# Patient Record
Sex: FEMALE | Race: WHITE | NOT HISPANIC OR LATINO | Employment: UNEMPLOYED | URBAN - METROPOLITAN AREA
[De-identification: names, ages, dates, MRNs, and addresses within clinical notes are randomized per-mention and may not be internally consistent; named-entity substitution may affect disease eponyms.]

---

## 2018-07-25 ENCOUNTER — OFFICE VISIT (OUTPATIENT)
Dept: FAMILY MEDICINE CLINIC | Facility: CLINIC | Age: 48
End: 2018-07-25
Payer: COMMERCIAL

## 2018-07-25 VITALS
SYSTOLIC BLOOD PRESSURE: 178 MMHG | HEART RATE: 97 BPM | TEMPERATURE: 98.3 F | BODY MASS INDEX: 29.06 KG/M2 | DIASTOLIC BLOOD PRESSURE: 116 MMHG | WEIGHT: 174.4 LBS | HEIGHT: 65 IN | OXYGEN SATURATION: 99 %

## 2018-07-25 DIAGNOSIS — R06.02 SOB (SHORTNESS OF BREATH) ON EXERTION: ICD-10-CM

## 2018-07-25 DIAGNOSIS — I10 UNCONTROLLED HYPERTENSION: ICD-10-CM

## 2018-07-25 DIAGNOSIS — Z13.220 LIPID SCREENING: ICD-10-CM

## 2018-07-25 DIAGNOSIS — J45.40 MODERATE PERSISTENT ASTHMA WITHOUT COMPLICATION: ICD-10-CM

## 2018-07-25 DIAGNOSIS — R05.8 COUGH PRESENT FOR GREATER THAN 3 WEEKS: ICD-10-CM

## 2018-07-25 DIAGNOSIS — Z13.29 THYROID DISORDER SCREEN: ICD-10-CM

## 2018-07-25 DIAGNOSIS — F17.200 TOBACCO USE DISORDER: Primary | ICD-10-CM

## 2018-07-25 PROCEDURE — 3725F SCREEN DEPRESSION PERFORMED: CPT | Performed by: PHYSICIAN ASSISTANT

## 2018-07-25 PROCEDURE — 99205 OFFICE O/P NEW HI 60 MIN: CPT | Performed by: PHYSICIAN ASSISTANT

## 2018-07-25 RX ORDER — ALBUTEROL SULFATE 90 UG/1
2 AEROSOL, METERED RESPIRATORY (INHALATION) EVERY 6 HOURS PRN
Qty: 18 G | Refills: 1 | Status: SHIPPED | OUTPATIENT
Start: 2018-07-25

## 2018-07-25 RX ORDER — FLUTICASONE PROPIONATE 110 UG/1
2 AEROSOL, METERED RESPIRATORY (INHALATION) 2 TIMES DAILY
Qty: 2 INHALER | Refills: 1 | Status: SHIPPED | OUTPATIENT
Start: 2018-07-25 | End: 2018-08-15

## 2018-07-25 RX ORDER — OLMESARTAN MEDOXOMIL 5 MG/1
5 TABLET ORAL DAILY
Qty: 90 TABLET | Refills: 0 | Status: SHIPPED | OUTPATIENT
Start: 2018-07-25 | End: 2018-07-31 | Stop reason: CLARIF

## 2018-07-25 RX ORDER — DULOXETIN HYDROCHLORIDE 60 MG/1
60 CAPSULE, DELAYED RELEASE ORAL 2 TIMES DAILY
COMMUNITY
Start: 2018-07-17

## 2018-07-25 RX ORDER — ALBUTEROL SULFATE 2.5 MG/3ML
2.5 SOLUTION RESPIRATORY (INHALATION) EVERY 6 HOURS PRN
COMMUNITY
End: 2018-07-25 | Stop reason: SDUPTHER

## 2018-07-25 RX ORDER — ALBUTEROL SULFATE 2.5 MG/3ML
2.5 SOLUTION RESPIRATORY (INHALATION) EVERY 6 HOURS PRN
Qty: 60 ML | Refills: 0 | Status: SHIPPED | OUTPATIENT
Start: 2018-07-25

## 2018-07-25 RX ORDER — OLMESARTAN MEDOXOMIL 5 MG/1
5 TABLET ORAL DAILY
COMMUNITY
End: 2018-07-25 | Stop reason: SDUPTHER

## 2018-07-25 RX ORDER — RIZATRIPTAN BENZOATE 10 MG/1
10 TABLET, ORALLY DISINTEGRATING ORAL
COMMUNITY
Start: 2014-02-27 | End: 2018-11-20 | Stop reason: SDUPTHER

## 2018-07-25 NOTE — PATIENT INSTRUCTIONS
Hypertension   AMBULATORY CARE:   Hypertension  is high blood pressure (BP)  Your BP is the force of your blood moving against the walls of your arteries  Normal BP is less than 120/80  Prehypertension is between 120/80 and 139/89  Hypertension is 140/90 or higher  Hypertension causes your BP to get so high that your heart has to work much harder than normal  This can damage your heart  You can control hypertension with a healthy lifestyle or medicines  A controlled blood pressure helps protect your organs, such as your heart, lungs, brain, and kidneys  Common symptoms include the following:   · Headache     · Blurred vision     · Chest pain     · Dizziness or weakness     · Trouble breathing    · Nosebleeds  Call 911 for any of the following:   · You have discomfort in your chest that feels like squeezing, pressure, fullness, or pain  · You become confused or have difficulty speaking  · You suddenly feel lightheaded or have trouble breathing  · You have pain or discomfort in your back, neck, jaw, stomach, or arm  Seek care immediately if:   · You have a severe headache or vision loss  · You have weakness in an arm or leg  Contact your healthcare provider if:   · You feel faint, dizzy, confused, or drowsy  · You have been taking your BP medicine and your BP is still higher than your healthcare provider says it should be  · You have questions or concerns about your condition or care  Treatment for hypertension  may include medicine to lower your BP and lower your cholesterol level  A low cholesterol level helps prevent heart disease and makes it easier to control your blood pressure  You may also need to make lifestyle changes  Take your medicine exactly as directed  Manage hypertension:  Talk with your healthcare provider about these and other ways to manage hypertension:  · Check your BP at home  Sit and rest for 5 minutes before you take your BP   Extend your arm and support it on a flat surface  Your arm should be at the same level as your heart  Follow the directions that came with your BP monitor  If possible, take at least 2 BP readings each time  Take your BP at least twice a day at the same times each day, such as morning and evening  Keep a record of your BP readings and bring it to your follow-up visits  Ask your healthcare provider what your BP should be  · Limit sodium (salt) as directed  Too much sodium can affect your fluid balance  Check labels to find low-sodium or no-salt-added foods  Some low-sodium foods use potassium salts for flavor  Too much potassium can also cause health problems  Your healthcare provider will tell you how much sodium and potassium are safe for you to have in a day  He or she may recommend that you limit sodium to 2,300 mg a day  · Follow the meal plan recommended by your healthcare provider  A dietitian or your provider can give you more information on low-sodium plans or the DASH (Dietary Approaches to Stop Hypertension) eating plan  The DASH plan is low in sodium, unhealthy fats, and total fat  It is high in potassium, calcium, and fiber  · Exercise to maintain a healthy weight  Exercise at least 30 minutes per day, on most days of the week  This will help decrease your blood pressure  Ask your healthcare provider about the best exercise plan for you  · Decrease stress  This may help lower your BP  Learn ways to relax, such as deep breathing or listening to music  · Limit alcohol  Women should limit alcohol to 1 drink a day  Men should limit alcohol to 2 drinks a day  A drink of alcohol is 12 ounces of beer, 5 ounces of wine, or 1½ ounces of liquor  · Do not smoke  Nicotine and other chemicals in cigarettes and cigars can increase your BP and also cause lung damage  Ask your healthcare provider for information if you currently smoke and need help to quit  E-cigarettes or smokeless tobacco still contain nicotine  Talk to your healthcare provider before you use these products  · Manage any other health conditions you have  Health conditions such as diabetes can increase your risk for hypertension  Follow your healthcare provider's instructions and take all your medicines as directed  Follow up with your healthcare provider as directed: You will need to return to have your BP checked and to have other lab tests done  Write down your questions so you remember to ask them during your visits  © 2017 [address] [employer] Information is for End User's use only and may not be sold, redistributed or otherwise used for commercial purposes  All illustrations and images included in CareNotes® are the copyrighted property of [employer]  or Venkata Walker  The above information is an  only  It is not intended as medical advice for individual conditions or treatments  Talk to your doctor, nurse or pharmacist before following any medical regimen to see if it is safe and effective for you  Asthma   AMBULATORY CARE:   Asthma  is a lung disease that makes breathing difficult  Chronic inflammation and reactions to triggers narrow the airways in your lungs  Asthma can become life-threatening if it is not managed  Cough-variant asthma  is a type of asthma that causes a dry cough that keeps coming back  A dry cough may be your only symptom, or you may also have chest tightness  These symptoms may be caused by exercise or exposure to odors, allergens, or respiratory tract infections  Cough-variant asthma is treated the same way as typical asthma  Common symptoms include the following:   · Coughing     · Wheezing     · Shortness of breath     · Chest tightness  Seek care immediately if:   · You have severe shortness of breath  · Your lips or nails turn blue or gray  · The skin around your neck and ribs pulls in with each breath      · You have shortness of breath, even after you take your short-term medicine as directed  · Your peak flow numbers are in the red zone of your AAP  Contact your healthcare provider if:   · You run out of medicine before your next refill is due  · Your symptoms get worse  · You need to take more medicine than usual to control your symptoms  · You have questions or concerns about your condition or care  Treatment for asthma  will depend on how severe your asthma is  Medicine may decrease inflammation, open airways, and make it easier to breathe  Medicines may be inhaled, taken as a pill, or injected  Short-term medicines relieve your symptoms quickly  Long-term medicines are used to prevent future attacks  You may also need medicine to help control your allergies  Manage and prevent future asthma attacks:   · Follow your asthma action plan  This is a written plan that you and your healthcare provider create  It explains which medicine you need and when to change doses if necessary  It also explains how you can monitor symptoms and use a peak flow meter  The meter measures how well your lungs are working  · Manage other health conditions , such as allergies, acid reflux, and sleep apnea  · Identify and avoid triggers  These may include pets, dust mites, mold, and cockroaches  · Do not smoke or be around others who smoke  Nicotine and other chemicals in cigarettes and cigars can cause lung damage  Ask your healthcare provider for information if you currently smoke and need help to quit  E-cigarettes or smokeless tobacco still contain nicotine  Talk to your healthcare provider before you use these products  · Ask about the flu vaccine  The flu can make your asthma worse  You may need a yearly flu shot  Follow up with your healthcare provider as directed: You will need to return to make sure your medicine is working and your symptoms are controlled   You may be referred to an asthma or allergy specialist  Darrel Alvares may be asked to keep a record of your peak flow values and bring it with you to your appointments  Write down your questions so you remember to ask them during your visits  © 2017 [facility] Information is for End User's use only and may not be sold, redistributed or otherwise used for commercial purposes  All illustrations and images included in CareNotes® are the copyrighted property of [employer]  or Venkata Walker  The above information is an  only  It is not intended as medical advice for individual conditions or treatments  Talk to your doctor, nurse or pharmacist before following any medical regimen to see if it is safe and effective for you  Cigarette Smoking and Your Health   AMBULATORY CARE:   Risks to your health if you smoke:  Nicotine and other chemicals found in tobacco damage every cell in your body  Even if you are a light smoker, you have an increased risk for cancer, heart disease, and lung disease  If you are pregnant or have diabetes, smoking increases your risk for complications  Benefits to your health if you stop smoking:   · You decrease respiratory symptoms such as coughing, wheezing, and shortness of breath  · You reduce your risk for cancers of the lung, mouth, throat, kidney, bladder, pancreas, stomach, and cervix  If you already have cancer, you increase the benefits of chemotherapy  You also reduce your risk for cancer returning or a second cancer from developing  · You reduce your risk for heart disease, blood clots, heart attack, and stroke  · You reduce your risk for lung infections, and diseases such as pneumonia, asthma, chronic bronchitis, and emphysema  · Your circulation improves  More oxygen can be delivered to your body  If you have diabetes, you lower your risk for complications, such as kidney, artery, and eye diseases  You also lower your risk for nerve damage   Nerve damage can lead to amputations, poor vision, and blindness  · You improve your body's ability to heal and to fight infections  Benefits to the health of others if you stop smoking:  Tobacco is harmful to nonsmokers who breathe in your secondhand smoke  The following are ways the health of others around you may improve when you stop smoking:  · You lower the risks for lung cancer and heart disease in nonsmoking adults  · If you are pregnant, you lower the risk for miscarriage, early delivery, low birth weight, and stillbirth  You also lower your baby's risk for SIDS, obesity, developmental delay, and neurobehavioral problems, such as ADHD  · If you have children, you lower their risk for ear infections, colds, pneumonia, bronchitis, and asthma  For more information and support to stop smoking:   · [employer]  Phone: 3- 844 - 770-3738  Web Address: www [employer]  Follow up with your healthcare provider as directed:  Write down your questions so you remember to ask them during your visits  © 2017 [address] [employer] Information is for End User's use only and may not be sold, redistributed or otherwise used for commercial purposes  All illustrations and images included in CareNotes® are the copyrighted property of [employer]  or Venkata Walker  The above information is an  only  It is not intended as medical advice for individual conditions or treatments  Talk to your doctor, nurse or pharmacist before following any medical regimen to see if it is safe and effective for you  How to Stop Smoking   AMBULATORY CARE:   You will improve your health and the health of others around you  if you stop smoking  Your risk for heart and lung disease, cancer, stroke, heart attack, and vision problems will also decrease  You can benefit from quitting no matter how long you have smoked  Prepare to stop smoking:  Nicotine is a highly addictive drug found in cigarettes   Withdrawal symptoms can happen when you stop smoking and make it hard to quit  These include anxiety, depression, irritability, trouble sleeping, and increased appetite  You increase your chances of success if you prepare to quit  · Set a quit date  Deryl Sin a date that is within the next 2 weeks  Do not pick a day that you think may be stressful or busy  Write down the day or [place] it on your calender  · Tell friends and family that you plan to quit  Explain that you may have withdrawal symptoms when you try to quit  Ask them to support you  They may be able to encourage you and help reduce your stress to make it easier for you to quit  · Make a list of your reasons for quitting  Put the list somewhere you will see it every day, such as your refrigerator  You can look at the list when you have a craving  · Remove all tobacco and nicotine products from your home, car, and workplace  Also, remove anything else that will tempt you to smoke, such as lighters, matches, or ashtrays  Clean your car, home, and places at work that smell like smoke  The smell of smoke can trigger a craving  · Identify triggers that make you want to smoke  This may include activities, feelings, or people  Also write down 1 way you can deal with each of your triggers  For example, if you want to smoke as soon as you wake up, plan another activity during this time, such as exercise  · Make a plan for how you will quit  Learn about the tools that can help you quit, such as medicine, counseling, or nicotine replacement therapy  Choose at least 2 options to help you quit  Tools to help you stop smoking:   · Counseling  from a trained healthcare provider can provide you with support and skills to quit smoking  The provider will also teach you to manage your withdrawal symptoms and cravings  You may receive counseling from one counselor, in group therapy, or through phone therapy called a quit line       · Nicotine replacement therapy (NRT)  such as nicotine patches, gum, or lozenges may help reduce your nicotine cravings  You may get these without a doctor's order  Do not use e-cigarettes or smokeless tobacco in place of cigarettes or to help you quit  They still contain nicotine  · Prescription medicines  such as nasal sprays or nicotine inhalers may help reduce your withdrawal symptoms  Other medicines may also be used to reduce your urge to smoke  Ask your healthcare provider about these medicines  You may need to start certain medicines 2 weeks before your quit date for them to work well  · Hypnosis  is a practice that helps guide you through thoughts and feelings  Hypnosis may help decrease your cravings and make you more willing to quit  · Acupuncture therapy  uses very thin needles to balance energy channels in the body  This is thought to help decrease cravings and symptoms of nicotine withdrawal      · Support groups  let you talk to others who are trying to quit or have already quit  It may be helpful to speak with others about how they quit  Manage your cravings:   · Avoid situations, people, and places that tempt you to smoke  Go to nonsmoking places, such as libraries or restaurants  Understand what tempts you and try to avoid these things  · Keep your hands busy  Hold things such as a stress ball or pen  · Put candy or toothpicks in your mouth  Keep lollipops, sugarless gum, or toothpicks with you at all times  · Do not have alcohol or caffeine  These drinks may tempt you to smoke  Drink healthy liquids such as water or juice instead  · Reward yourself when you resist your cravings  Rewards will motivate you and help you stay positive  · Do an activity that distracts you from your craving  Examples include going for a walk, exercising, or cleaning  Prevent weight gain after you quit:  You may gain a few pounds after you quit smoking  It is healthier for you to gain a few pounds than to continue to smoke   The following can help you prevent weight gain:  · Eat healthy foods  These include fruits, vegetables, whole-grain breads, low-fat dairy products, beans, lean meats, and fish  Eat healthy snacks, such as low-fat yogurt, if you get hungry between meals  · Drink water before, during, and between meals  This will make your stomach feel full and help prevent you from overeating  Ask your healthcare provider how much liquid to drink each day and which liquids are best for you  · Exercise  Take a walk or do some kind of exercise every day  Ask your healthcare provider what exercise is right for you  This may help reduce your cravings and reduce stress  For more support and information:   · [employer]  Phone: 9- 755 - 272-6370  Web Address: www [facility]  © 2017 [address] [employer] Information is for End User's use only and may not be sold, redistributed or otherwise used for commercial purposes  All illustrations and images included in CareNotes® are the copyrighted property of [employer]  or Venkata Walker  The above information is an  only  It is not intended as medical advice for individual conditions or treatments  Talk to your doctor, nurse or pharmacist before following any medical regimen to see if it is safe and effective for you

## 2018-07-25 NOTE — PROGRESS NOTES
New Patient     Flor Anderson 56 y o  female   Date:  7/25/2018      Assessment and Plan:    Kristyn Castañeda was seen today for establish care and shortness of breath  Diagnoses and all orders for this visit:    Tobacco use disorder  -     fluticasone (FLOVENT HFA) 110 MCG/ACT inhaler; Inhale 2 puffs 2 (two) times a day Rinse mouth after use  -     albuterol (PROVENTIL HFA,VENTOLIN HFA) 90 mcg/act inhaler; Inhale 2 puffs every 6 (six) hours as needed for wheezing  -     XR chest pa & lateral; Future  -     Spirometry pre and post bronchodilator; Future  - encourage cessation    Moderate persistent asthma without complication  -     fluticasone (FLOVENT HFA) 110 MCG/ACT inhaler; Inhale 2 puffs 2 (two) times a day Rinse mouth after use  -     albuterol (PROVENTIL HFA,VENTOLIN HFA) 90 mcg/act inhaler; Inhale 2 puffs every 6 (six) hours as needed for wheezing  -     albuterol (2 5 mg/3 mL) 0 083 % nebulizer solution; Take 1 vial (2 5 mg total) by nebulization every 6 (six) hours as needed for wheezing or shortness of breath  -     Nebulizer  -     XR chest pa & lateral; Future  -     Spirometry pre and post bronchodilator; Future  -     CBC and differential; Future    Uncontrolled hypertension  -     olmesartan (BENICAR) 5 mg tablet; Take 1 tablet (5 mg total) by mouth daily  -     ECG 12 lead; Future  -     XR chest pa & lateral; Future  -     CBC and differential; Future  -     Comprehensive metabolic panel; Future  -     TSH, 3rd generation with T4 reflex; Future  -     Troponin I; Future  - smoking cessation, well balanced low salt diet   - follow up in 2 weeks and will likely need dose adjustment in benicar     Cough present for greater than 3 weeks  -     XR chest pa & lateral; Future  -     Spirometry pre and post bronchodilator; Future    Lipid screening  -     Lipid panel; Future    Thyroid disorder screen  -     TSH, 3rd generation with T4 reflex;  Future    SOB (shortness of breath) on exertion  -     ECG 12 lead; Future  -     XR chest pa & lateral; Future  -     CBC and differential; Future  -     Comprehensive metabolic panel; Future  -     Troponin I; Future            HPI:  Chief Complaint   Patient presents with    Establish Care     initial visit    Shortness of Breath     Pt states that within the last 2 month she feels like she is unable to catch her breath  HPI  Patient is a 57 yo female who presents to establish care  Her last PCP was Dr Analia Miller, last seen >1 year ago  She follows with Dr Mccauley/Neurology for migraines x 7-8 years and Dr Geeta Garcia x 3-4 years  She did have hx of vaginal cancer s/p hypsterectomy and radiation with [facility] and will be going back for routine GYN care  She sas not been to eye doctor or dentist due to lack of insurance  She did have hx of HTN and was treated with norvasc which was d/c'ed due to edema and then was treated with benicar 5 mg which she has been out of for months  She does have hx of asthma and smokes 1 pppd >30 years  Over the past 2-3 months, she had been experiencing a lot of sob and wheezing, dry cough  Sob was mostly when active or exerting herself, even when standing to do dishes or showering and then would improve with rest  She did have an inhlaer with temporary relief but has been out for months  She denies associated chest pain, diaphoresis, edema, syncope but sometimes would get so out of breath she would get a little dizzy/lightheaded  She was waking up in the middle of the night gasping for air and humid weather has been making chest very tight  She has never had dx of COPD, only asthma  No recent illnesses  She denies hx of allergies  She was using inhaler more than once in a day  Review of Systems   Constitutional: Negative for activity change, appetite change, chills, diaphoresis and fever  HENT: Negative for congestion, ear pain, postnasal drip, sore throat and trouble swallowing  Eyes: Negative      Respiratory: Positive for cough, chest tightness, shortness of breath and wheezing  Negative for apnea, choking and stridor  Cardiovascular: Negative for chest pain, palpitations and leg swelling  Gastrointestinal: Negative  Endocrine: Negative  Genitourinary: Negative  Musculoskeletal: Negative  Skin: Negative  Allergic/Immunologic: Negative  Neurological: Negative  Headaches: hx of migraines  Hematological: Negative  Psychiatric/Behavioral: Positive for dysphoric mood  Negative for behavioral problems, sleep disturbance and suicidal ideas  The patient is nervous/anxious          Past Medical History:   Diagnosis Date    Asthma     Cancer ([facility] )     Chicken pox     Depression     Headache     Migraines     Vaginal cancer (HCC)     s/p radiation, treated 2014, [facility]     Patient Active Problem List   Diagnosis    Bipolar 2 disorder, major depressive episode ([facility] )    Chronic migraine without aura    Essential hypertension    Generalized anxiety disorder    Lichen    Tobacco use disorder       Past Surgical History:   Procedure Laterality Date    CARPAL TUNNEL RELEASE Bilateral     HYSTERECTOMY         Social History     Social History    Marital status: Legally      Spouse name: N/A    Number of children: N/A    Years of education: N/A     Social History Main Topics    Smoking status: Current Every Day Smoker     Packs/day: 1 00     Years: 30 00    Smokeless tobacco: Never Used    Alcohol use No    Drug use: No    Sexual activity: Not Asked     Other Topics Concern    None     Social History Narrative    Lives with sister     Unemployed        Family History   Problem Relation Age of Onset    Hypertension Mother     Heart disease Father     Diabetes Father     Hyperlipidemia Father     Heart attack Father     Stroke Maternal Grandfather     Cancer Maternal Uncle     Brain cancer Maternal Uncle     Stroke Paternal Grandmother        No Known Allergies      Current Outpatient Prescriptions:     albuterol (2 5 mg/3 mL) 0 083 % nebulizer solution, Take 1 vial (2 5 mg total) by nebulization every 6 (six) hours as needed for wheezing or shortness of breath, Disp: 60 mL, Rfl: 0    DULoxetine (CYMBALTA) 60 mg delayed release capsule, 1 tab po bid, Disp: , Rfl:     olmesartan (BENICAR) 5 mg tablet, Take 1 tablet (5 mg total) by mouth daily, Disp: 90 tablet, Rfl: 0    rizatriptan (MAXALT-MLT) 10 MG disintegrating tablet, Take 10 mg by mouth, Disp: , Rfl:     albuterol (PROVENTIL HFA,VENTOLIN HFA) 90 mcg/act inhaler, Inhale 2 puffs every 6 (six) hours as needed for wheezing, Disp: 18 g, Rfl: 1    fluticasone (FLOVENT HFA) 110 MCG/ACT inhaler, Inhale 2 puffs 2 (two) times a day Rinse mouth after use , Disp: 2 Inhaler, Rfl: 1      Physical Exam:  BP (!) 178/116 (BP Location: Left arm, Patient Position: Sitting, Cuff Size: Standard)   Pulse 97   Temp 98 3 °F (36 8 °C) (Tympanic)   Ht 5' 5" (1 651 m)   Wt 79 1 kg (174 lb 6 4 oz)   SpO2 99%   BMI 29 02 kg/m²     Physical Exam   Constitutional: She is oriented to person, place, and time  She appears well-developed and well-nourished  No distress  HENT:   Head: Normocephalic and atraumatic  Right Ear: External ear normal    Left Ear: External ear normal    Mouth/Throat: Oropharynx is clear and moist  No oropharyngeal exudate  Eyes: Conjunctivae are normal  Pupils are equal, round, and reactive to light  Right eye exhibits no discharge  Left eye exhibits no discharge  Neck: Normal range of motion  Neck supple  Cardiovascular: Normal rate, regular rhythm, normal heart sounds and intact distal pulses  No murmur heard  Pulmonary/Chest: Effort normal  No respiratory distress  She has no wheezes  She has no rales  Decreased breath sounds and air movement    Abdominal: Soft  Bowel sounds are normal  She exhibits no distension  There is no tenderness  There is no guarding  Musculoskeletal: Normal range of motion   She exhibits no edema or deformity  Lymphadenopathy:     She has no cervical adenopathy  Neurological: She is alert and oriented to person, place, and time  No cranial nerve deficit  Skin: Skin is warm and dry  No rash noted  She is not diaphoretic  Psychiatric: She has a normal mood and affect   Her behavior is normal

## 2018-07-31 ENCOUNTER — TELEPHONE (OUTPATIENT)
Dept: FAMILY MEDICINE CLINIC | Facility: CLINIC | Age: 48
End: 2018-07-31

## 2018-07-31 DIAGNOSIS — I10 ESSENTIAL HYPERTENSION: Primary | ICD-10-CM

## 2018-07-31 RX ORDER — LOSARTAN POTASSIUM 50 MG/1
25 TABLET ORAL DAILY
Qty: 90 TABLET | Refills: 0 | Status: SHIPPED | OUTPATIENT
Start: 2018-07-31 | End: 2018-08-15 | Stop reason: SDUPTHER

## 2018-07-31 NOTE — TELEPHONE ENCOUNTER
Per note on my desk pt needs sleep consult Pa for home sleep study  I called and spoke to insurance and I can not move forward with current dx  Women at insurance stated they are not a problem, so I have to call back when Dr Prashanth Ramírez is back in office and can update dx codes

## 2018-07-31 NOTE — TELEPHONE ENCOUNTER
Pt called and stated Benicar was denied  Needs auth  Researching on drug formulary,medication is not even allowed  I printed info and put it on our desk

## 2018-08-03 RX ORDER — DULOXETIN HYDROCHLORIDE 60 MG/1
CAPSULE, DELAYED RELEASE ORAL
COMMUNITY
Start: 2018-07-31 | End: 2018-08-08

## 2018-08-03 RX ORDER — LAMOTRIGINE 100 MG/1
100 TABLET ORAL
COMMUNITY
Start: 2018-07-31 | End: 2021-04-19

## 2018-08-08 ENCOUNTER — OFFICE VISIT (OUTPATIENT)
Dept: FAMILY MEDICINE CLINIC | Facility: CLINIC | Age: 48
End: 2018-08-08
Payer: COMMERCIAL

## 2018-08-08 VITALS
WEIGHT: 175.2 LBS | OXYGEN SATURATION: 92 % | TEMPERATURE: 97.5 F | HEIGHT: 65 IN | DIASTOLIC BLOOD PRESSURE: 110 MMHG | HEART RATE: 98 BPM | SYSTOLIC BLOOD PRESSURE: 166 MMHG | BODY MASS INDEX: 29.19 KG/M2

## 2018-08-08 DIAGNOSIS — R06.02 EXERTIONAL SHORTNESS OF BREATH: ICD-10-CM

## 2018-08-08 DIAGNOSIS — R06.01 ORTHOPNEA: Primary | ICD-10-CM

## 2018-08-08 DIAGNOSIS — I10 UNCONTROLLED HYPERTENSION: ICD-10-CM

## 2018-08-08 DIAGNOSIS — F17.200 TOBACCO USE DISORDER: ICD-10-CM

## 2018-08-08 PROCEDURE — 99214 OFFICE O/P EST MOD 30 MIN: CPT | Performed by: PHYSICIAN ASSISTANT

## 2018-08-08 NOTE — PATIENT INSTRUCTIONS
Please try to go for blood work and testing as soon as possible  Start taking full losartan 50 mg tablet  Call office on Friday before noon and if high, we will start taking 2 tablets at a time  Follow up by next week  Please continue to cut back on smoking  If you develop severe trouble breathing, chest pain, dizziness, feeling as you are going to pass out, numbness/weakness in extremities please go to ER

## 2018-08-08 NOTE — PROGRESS NOTES
Routine Follow-up    [facility] 56 y o  female   Date:  8/8/2018      Assessment and Plan:    Luanne Park was seen today for follow-up  Diagnoses and all orders for this visit:    Orthopnea  -     NT-BNP PRO; Future  - encourage EKG, chest xray and other labs     Exertional shortness of breath  - likely COPD, encourage chest xray and PFTs; continue twice daily flovent and rescue inhaler, obtain nebulizer as soon as able   - pending results of tests, will likely require pulm consult   - consult to cut back and stop smoking   - please obtain blood work that was given at last appointment 2 weeks ago    Tobacco use disorder  - smoking cessation     Uncontrolled hypertension  - increase losartan to full 50 mg tablet, call on Friday and will likely increase to 100 mg ( 2 tabs at a time)   - follow up in 1 week and will likely need a second medication  - smoking cessation  - reduce stress, low salt diet   - please go for EKG and blood work, chest xray as soon as able   - please go to ER with persistent sob, chest pain, dizziness, syncope, numbness/tingling in either legs or face     *Stressed the importance of follow up and obtaining the labs/testing as ordered       HPI:  Chief Complaint   Patient presents with    Follow-up     no test or labs done     HPI  Patient is a 55 yo female who presents for a 2 week follow up for BP and shortness of breath  At last visit 2 weeks ago, she was prescribed benicar but it was not covered by her insurance, so losartan was sent to pharmacy but only as 1/2 tablet of 50 mg  So she has only been taking 25 mg of losartan daily and her reading at home have been 160-170s/100s  She reports that she had a mild headache the first 2 days on it but then it went away  She started taking flovent twice daily and still needing rescue inhaler 2-3 times daily  She does not have nebulizer yet as she has to call her insurance as to where she can pick it up through   She did not go for blood work, ekg, chest xray, PFTs  She reports she is too busy because her mother is in the hospital in [facility] currently  She appears very overwhelmed and tearful  She is still smoking - cut back 1 ppd, now 1 pack per every 3 days  She still feels sob with exertion and finds she has to prop herself up at night  She denies chest pain  She reports having chest pain many years ago and saw a cardiology with normal nuclear stress test but that was about 10 years ago and never needed to follow up that  This visit, we stressed the importance of obtaining her testing and blood work and that we will need close follow up weekly until her BP is more controlled  ROS: Review of Systems   Constitutional: Positive for fatigue  Negative for activity change, appetite change, chills, diaphoresis and fever  HENT: Negative  Respiratory: Positive for shortness of breath  Negative for cough, wheezing and stridor  Cardiovascular: Negative for chest pain, palpitations and leg swelling  Gastrointestinal: Negative  Genitourinary: Negative  Skin: Negative  Neurological: Positive for headaches (resolved)  Negative for dizziness, tremors, seizures, syncope, facial asymmetry, speech difficulty, weakness, light-headedness and numbness  Hematological: Negative  Psychiatric/Behavioral: Negative for behavioral problems and sleep disturbance  The patient is nervous/anxious (increased personal stressors)          Past Medical History:   Diagnosis Date    Asthma     [facility] )     Chicken pox     Depression     Headache     Migraines     [facility] )     s/p radiation, treated 2014, [facility]     Patient Active Problem List   Diagnosis    Bipolar 2 disorder, major depressive episode ([facility] )    Chronic migraine without aura    Uncontrolled hypertension    Generalized anxiety disorder    Lichen    Tobacco use disorder       Past Surgical History:   Procedure Laterality Date    CARPAL TUNNEL RELEASE Bilateral     HYSTERECTOMY Social History     Social History    Marital status: Legally      Spouse name: N/A    Number of children: N/A    Years of education: N/A     Social History Main Topics    Smoking status: Current Every Day Smoker     Packs/day: 1 00     Years: 30 00    Smokeless tobacco: Never Used    Alcohol use No    Drug use: No    Sexual activity: Not Asked     Other Topics Concern    None     Social History Narrative    Lives with sister     Unemployed        Family History   Problem Relation Age of Onset    Hypertension Mother     Heart disease Father     Diabetes Father     Hyperlipidemia Father     Heart attack Father     Stroke Maternal Grandfather     Cancer Maternal Uncle     Brain cancer Maternal Uncle     Stroke Paternal Grandmother        Allergies   Allergen Reactions    Norvasc [Amlodipine]      edema         Current Outpatient Prescriptions:     albuterol (PROVENTIL HFA,VENTOLIN HFA) 90 mcg/act inhaler, Inhale 2 puffs every 6 (six) hours as needed for wheezing, Disp: 18 g, Rfl: 1    DULoxetine (CYMBALTA) 60 mg delayed release capsule, 1 tab po bid, Disp: , Rfl:     fluticasone (FLOVENT HFA) 110 MCG/ACT inhaler, Inhale 2 puffs 2 (two) times a day Rinse mouth after use , Disp: 2 Inhaler, Rfl: 1    lamoTRIgine (LaMICtal) 100 mg tablet, Take 100 mg by mouth, Disp: , Rfl:     losartan (COZAAR) 50 mg tablet, Take 0 5 tablets (25 mg total) by mouth daily (Patient taking differently: Take 50 mg by mouth daily  ), Disp: 90 tablet, Rfl: 0    rizatriptan (MAXALT-MLT) 10 MG disintegrating tablet, Take 10 mg by mouth, Disp: , Rfl:     albuterol (2 5 mg/3 mL) 0 083 % nebulizer solution, Take 1 vial (2 5 mg total) by nebulization every 6 (six) hours as needed for wheezing or shortness of breath, Disp: 60 mL, Rfl: 0      Physical Exam:  BP (!) 166/110 (BP Location: Left arm, Patient Position: Sitting, Cuff Size: Standard)   Pulse 98   Temp 97 5 °F (36 4 °C) (Tympanic)   Ht 5' 5 35" (1 66 m)   Wt 79 5 kg (175 lb 3 2 oz)   SpO2 92%   BMI 28 84 kg/m²     Physical Exam   Constitutional: She is oriented to person, place, and time  She appears well-developed and well-nourished  No distress  HENT:   Head: Normocephalic and atraumatic  Right Ear: External ear normal    Left Ear: External ear normal    Mouth/Throat: Oropharynx is clear and moist    Eyes: Conjunctivae are normal  Pupils are equal, round, and reactive to light  Neck: Normal range of motion  Neck supple  No JVD present  No thyromegaly present  Cardiovascular: Normal rate, regular rhythm, normal heart sounds and intact distal pulses  No murmur heard  Pulmonary/Chest: Effort normal  No respiratory distress  She has no wheezes  She has no rales  Decreased breath sounds bibasilar     Musculoskeletal: Normal range of motion  She exhibits no edema  Lymphadenopathy:     She has no cervical adenopathy  Neurological: She is alert and oriented to person, place, and time  No cranial nerve deficit  Skin: Skin is warm and dry  No rash noted  Psychiatric: She has a normal mood and affect     Tearful and overwhelmed at times         Labs:  No results found for: WBC, HGB, HCT, MCV, PLT  No results found for: NA, K, CL, CO2, ANIONGAP, BUN, CREATININE, GLUCOSE, GLUF, CALCIUM, CORRECTEDCA, AST, ALT, ALKPHOS, PROT, ALBUMIN, BILITOT, EGFR

## 2018-08-09 ENCOUNTER — APPOINTMENT (OUTPATIENT)
Dept: LAB | Age: 48
End: 2018-08-09
Payer: COMMERCIAL

## 2018-08-09 ENCOUNTER — APPOINTMENT (OUTPATIENT)
Dept: RADIOLOGY | Age: 48
End: 2018-08-09
Payer: COMMERCIAL

## 2018-08-09 ENCOUNTER — CLINICAL SUPPORT (OUTPATIENT)
Dept: URGENT CARE | Age: 48
End: 2018-08-09
Payer: COMMERCIAL

## 2018-08-09 DIAGNOSIS — Z13.29 THYROID DISORDER SCREEN: ICD-10-CM

## 2018-08-09 DIAGNOSIS — J45.40 MODERATE PERSISTENT ASTHMA WITHOUT COMPLICATION: ICD-10-CM

## 2018-08-09 DIAGNOSIS — R06.02 SOB (SHORTNESS OF BREATH) ON EXERTION: ICD-10-CM

## 2018-08-09 DIAGNOSIS — F17.200 TOBACCO USE DISORDER: ICD-10-CM

## 2018-08-09 DIAGNOSIS — I10 UNCONTROLLED HYPERTENSION: ICD-10-CM

## 2018-08-09 DIAGNOSIS — Z13.220 LIPID SCREENING: ICD-10-CM

## 2018-08-09 DIAGNOSIS — R06.01 ORTHOPNEA: ICD-10-CM

## 2018-08-09 DIAGNOSIS — R05.8 COUGH PRESENT FOR GREATER THAN 3 WEEKS: ICD-10-CM

## 2018-08-09 LAB
ALBUMIN SERPL BCP-MCNC: 4.2 G/DL (ref 3.5–5)
ALP SERPL-CCNC: 82 U/L (ref 46–116)
ALT SERPL W P-5'-P-CCNC: 163 U/L (ref 12–78)
ANION GAP SERPL CALCULATED.3IONS-SCNC: 6 MMOL/L (ref 4–13)
AST SERPL W P-5'-P-CCNC: 66 U/L (ref 5–45)
BASOPHILS # BLD AUTO: 0.05 THOUSANDS/ΜL (ref 0–0.1)
BASOPHILS NFR BLD AUTO: 0 % (ref 0–1)
BILIRUB SERPL-MCNC: 2.22 MG/DL (ref 0.2–1)
BUN SERPL-MCNC: 19 MG/DL (ref 5–25)
CALCIUM SERPL-MCNC: 9.6 MG/DL (ref 8.3–10.1)
CHLORIDE SERPL-SCNC: 105 MMOL/L (ref 100–108)
CHOLEST SERPL-MCNC: 235 MG/DL (ref 50–200)
CO2 SERPL-SCNC: 28 MMOL/L (ref 21–32)
CREAT SERPL-MCNC: 1.32 MG/DL (ref 0.6–1.3)
EOSINOPHIL # BLD AUTO: 0.11 THOUSAND/ΜL (ref 0–0.61)
EOSINOPHIL NFR BLD AUTO: 1 % (ref 0–6)
ERYTHROCYTE [DISTWIDTH] IN BLOOD BY AUTOMATED COUNT: 14.4 % (ref 11.6–15.1)
GFR SERPL CREATININE-BSD FRML MDRD: 48 ML/MIN/1.73SQ M
GLUCOSE P FAST SERPL-MCNC: 99 MG/DL (ref 65–99)
HCT VFR BLD AUTO: 45.1 % (ref 34.8–46.1)
HDLC SERPL-MCNC: 42 MG/DL (ref 40–60)
HGB BLD-MCNC: 14.8 G/DL (ref 11.5–15.4)
IMM GRANULOCYTES # BLD AUTO: 0.04 THOUSAND/UL (ref 0–0.2)
IMM GRANULOCYTES NFR BLD AUTO: 0 % (ref 0–2)
LDLC SERPL CALC-MCNC: 167 MG/DL (ref 0–100)
LYMPHOCYTES # BLD AUTO: 2.85 THOUSANDS/ΜL (ref 0.6–4.47)
LYMPHOCYTES NFR BLD AUTO: 26 % (ref 14–44)
MCH RBC QN AUTO: 29.6 PG (ref 26.8–34.3)
MCHC RBC AUTO-ENTMCNC: 32.8 G/DL (ref 31.4–37.4)
MCV RBC AUTO: 90 FL (ref 82–98)
MONOCYTES # BLD AUTO: 0.65 THOUSAND/ΜL (ref 0.17–1.22)
MONOCYTES NFR BLD AUTO: 6 % (ref 4–12)
NEUTROPHILS # BLD AUTO: 7.42 THOUSANDS/ΜL (ref 1.85–7.62)
NEUTS SEG NFR BLD AUTO: 67 % (ref 43–75)
NONHDLC SERPL-MCNC: 193 MG/DL
NRBC BLD AUTO-RTO: 0 /100 WBCS
NT-PROBNP SERPL-MCNC: 8721 PG/ML
PLATELET # BLD AUTO: 310 THOUSANDS/UL (ref 149–390)
PMV BLD AUTO: 10.2 FL (ref 8.9–12.7)
POTASSIUM SERPL-SCNC: 4.4 MMOL/L (ref 3.5–5.3)
PROT SERPL-MCNC: 7.3 G/DL (ref 6.4–8.2)
RBC # BLD AUTO: 5 MILLION/UL (ref 3.81–5.12)
SODIUM SERPL-SCNC: 139 MMOL/L (ref 136–145)
T4 FREE SERPL-MCNC: 0.37 NG/DL (ref 0.76–1.46)
TRIGL SERPL-MCNC: 128 MG/DL
TROPONIN I SERPL-MCNC: <0.02 NG/ML
TSH SERPL DL<=0.05 MIU/L-ACNC: 85.5 UIU/ML (ref 0.36–3.74)
WBC # BLD AUTO: 11.12 THOUSAND/UL (ref 4.31–10.16)

## 2018-08-09 PROCEDURE — 71046 X-RAY EXAM CHEST 2 VIEWS: CPT

## 2018-08-09 PROCEDURE — 85025 COMPLETE CBC W/AUTO DIFF WBC: CPT

## 2018-08-09 PROCEDURE — 93005 ELECTROCARDIOGRAM TRACING: CPT

## 2018-08-09 PROCEDURE — 80061 LIPID PANEL: CPT

## 2018-08-09 PROCEDURE — 80053 COMPREHEN METABOLIC PANEL: CPT

## 2018-08-09 PROCEDURE — 84439 ASSAY OF FREE THYROXINE: CPT

## 2018-08-09 PROCEDURE — 84443 ASSAY THYROID STIM HORMONE: CPT

## 2018-08-09 PROCEDURE — 83880 ASSAY OF NATRIURETIC PEPTIDE: CPT

## 2018-08-09 PROCEDURE — 36415 COLL VENOUS BLD VENIPUNCTURE: CPT

## 2018-08-09 PROCEDURE — 84484 ASSAY OF TROPONIN QUANT: CPT

## 2018-08-10 LAB
ATRIAL RATE: 90 BPM
ATRIAL RATE: 90 BPM
P AXIS: 71 DEGREES
P AXIS: 71 DEGREES
PR INTERVAL: 162 MS
PR INTERVAL: 162 MS
QRS AXIS: -8 DEGREES
QRS AXIS: -8 DEGREES
QRSD INTERVAL: 88 MS
QRSD INTERVAL: 88 MS
QT INTERVAL: 396 MS
QT INTERVAL: 396 MS
QTC INTERVAL: 484 MS
QTC INTERVAL: 484 MS
T WAVE AXIS: 113 DEGREES
T WAVE AXIS: 113 DEGREES
VENTRICULAR RATE: 90 BPM
VENTRICULAR RATE: 90 BPM

## 2018-08-10 PROCEDURE — 93010 ELECTROCARDIOGRAM REPORT: CPT

## 2018-08-15 ENCOUNTER — OFFICE VISIT (OUTPATIENT)
Dept: FAMILY MEDICINE CLINIC | Facility: CLINIC | Age: 48
End: 2018-08-15
Payer: COMMERCIAL

## 2018-08-15 VITALS
TEMPERATURE: 97.7 F | BODY MASS INDEX: 27.47 KG/M2 | HEIGHT: 67 IN | OXYGEN SATURATION: 99 % | SYSTOLIC BLOOD PRESSURE: 176 MMHG | HEART RATE: 92 BPM | WEIGHT: 175 LBS | DIASTOLIC BLOOD PRESSURE: 118 MMHG

## 2018-08-15 DIAGNOSIS — R17 ELEVATED BILIRUBIN: ICD-10-CM

## 2018-08-15 DIAGNOSIS — J44.9 CHRONIC OBSTRUCTIVE PULMONARY DISEASE, UNSPECIFIED COPD TYPE (HCC): ICD-10-CM

## 2018-08-15 DIAGNOSIS — J45.40 MODERATE PERSISTENT ASTHMA WITHOUT COMPLICATION: ICD-10-CM

## 2018-08-15 DIAGNOSIS — R79.89 ELEVATED BRAIN NATRIURETIC PEPTIDE (BNP) LEVEL: ICD-10-CM

## 2018-08-15 DIAGNOSIS — D72.829 LEUKOCYTOSIS, UNSPECIFIED TYPE: ICD-10-CM

## 2018-08-15 DIAGNOSIS — R74.01 TRANSAMINITIS: ICD-10-CM

## 2018-08-15 DIAGNOSIS — R06.00 DOE (DYSPNEA ON EXERTION): ICD-10-CM

## 2018-08-15 DIAGNOSIS — F17.200 TOBACCO USE DISORDER: ICD-10-CM

## 2018-08-15 DIAGNOSIS — R94.31 ABNORMAL EKG: ICD-10-CM

## 2018-08-15 DIAGNOSIS — E78.00 PURE HYPERCHOLESTEROLEMIA: ICD-10-CM

## 2018-08-15 DIAGNOSIS — I10 ESSENTIAL HYPERTENSION: ICD-10-CM

## 2018-08-15 DIAGNOSIS — R68.81 EARLY SATIETY: ICD-10-CM

## 2018-08-15 DIAGNOSIS — R79.89 ELEVATED SERUM CREATININE: ICD-10-CM

## 2018-08-15 DIAGNOSIS — E03.9 HYPOTHYROIDISM, UNSPECIFIED TYPE: Primary | ICD-10-CM

## 2018-08-15 PROCEDURE — 99215 OFFICE O/P EST HI 40 MIN: CPT | Performed by: PHYSICIAN ASSISTANT

## 2018-08-15 RX ORDER — FLUTICASONE PROPIONATE 220 UG/1
2 AEROSOL, METERED RESPIRATORY (INHALATION) 2 TIMES DAILY
Qty: 3 INHALER | Refills: 1 | Status: SHIPPED | OUTPATIENT
Start: 2018-08-15 | End: 2018-11-20

## 2018-08-15 RX ORDER — LEVOTHYROXINE SODIUM 0.12 MG/1
125 TABLET ORAL DAILY
Qty: 60 TABLET | Refills: 0 | Status: SHIPPED | OUTPATIENT
Start: 2018-08-15 | End: 2018-10-18 | Stop reason: SDUPTHER

## 2018-08-15 RX ORDER — LOSARTAN POTASSIUM 100 MG/1
100 TABLET ORAL DAILY
Qty: 90 TABLET | Refills: 1 | Status: SHIPPED | OUTPATIENT
Start: 2018-08-15

## 2018-08-15 NOTE — PROGRESS NOTES
Routine Follow-up    Joann Mills 56 y o  female   Date:  8/15/2018      Assessment and Plan:    Swathi Talamantes was seen today for follow-up  Diagnoses and all orders for this visit:    Hypothyroidism, unspecified type  -    Start levothyroxine 125 mcg tablet; Take 1 tablet (125 mcg total) by mouth daily  -     TSH, 3rd generation; Future  -     T4, free; Future  -     Thyroid Antibodies Panel; Future  -     US thyroid; Future  - recheck thyroid labs in 6 weeks and adjust prn    Tobacco use disorder  -     fluticasone (FLOVENT HFA) 220 mcg/act inhaler; Inhale 2 puffs 2 (two) times a day Rinse mouth after use  - continue smoking cessation; she has decreased to 2 cigs per day   - avoid wellbutrin and chantix with bipolar d/o; avoid nicotine patches given several abnormal heart and lung findings    Moderate persistent asthma without complication  - increase dose of flovent, continue prn albuterol and obtain nebulizer machine    Essential hypertension  -     losartan (COZAAR) 100 MG tablet; Take 1 tablet (100 mg total) by mouth daily  -     Echo complete with contrast if indicated; Future  -     Ambulatory referral to Cardiology; Future  -     Comprehensive metabolic panel; Future  -     CBC and differential; Future  - BP still poorly controlled, also component of stress; current without chest pain or overt difficulty breathing  - increase losartan to 100 from 50 and if need a second medication would likely consider BB    Chronic obstructive pulmonary disease, unspecified COPD type (HCC)  -     fluticasone (FLOVENT HFA) 220 mcg/act inhaler; Inhale 2 puffs 2 (two) times a day Rinse mouth after use  - ? If she does have COPD given smoking history   - chest xray normal but still outstanding to complete PFTs; should obtain CT chest but will hold given several other testing and referrals are overwhelming her     Elevated brain natriuretic peptide (BNP) level  -     Echo complete with contrast if indicated;  Future  - Ambulatory referral to Cardiology; Future    Abnormal EKG  -     Echo complete with contrast if indicated; Future  -     Ambulatory referral to Cardiology; Future    STEWART (dyspnea on exertion)  -     Ambulatory referral to Cardiology; Future  - ?related to lung or cardiac etiologies or both     Transaminitis  -     US abdomen complete; Future  - ?fatty liver vs hepatorenal syndrome vs 2/2 to psychiatric medicine  - will avoid starting a statin at this time     Elevated bilirubin  -     US abdomen complete; Future    Early satiety  -     US abdomen complete; Future    Elevated serum creatinine  -     Comprehensive metabolic panel; Future  - ? CKD vs POLO as we have no other baselines  - adequate hydration  - improve BP control  - should obtain VAS renal in future   - ?hepatorenal  - continue ARB due to renal protective     Leukocytosis, unspecified type  -     CBC and differential; Future  - no signs of active infection  - ?related to psych medications  - will continue to monitor as very mild    Pure hypercholesterolemia  - avoid starting statin at this time given elevated LFTs  - lifestyle modifications; will consider zetia       Follow up in 1 week and will be calling cardiology office to obtain establishing appt at earliest possibility     HPI:  Chief Complaint   Patient presents with    Follow-up     labs in chart/chest xray/ekg in chart     HPI  Patient is a 57 yo female who presents to recheck BP and numerous abnormalities on testing and lab work  She just establish with out office less than 1 month ago and has been started on BP medicine due to elevated BP  She was recently increased from 25 mg to 50 mg losartan and BP still elevated  She is tolerating well  She is extremely stressed today because she is overwhelmed by everything  She has cut back to 2 cigs per day  She is still have difficulty with breathing on exertion   She is taking flovent twice daily and needing prn albuterol inhaler about once to twice daily  She still has not been able to obtain nebulizer machine  Her chest xray was normal but her EKG had several abnormalities  She still have not been able to go for her PFTs  She should have CT chest as well but is very overwhelmed for the amount of tests she is requiring  She did go for bloodwork and had several abnormalities including: elevated BNP, POLO vs CKD with elevated Cr and reduced GFR, transaminitis and elevated bilirubin, very high tsh and low t4, elevated LDL/total cholesterol  She does follow up with Dr Fariba Cho with psychiatry regularly and has been on these long term psych medications for awhile without any adjustments recently  ROS: Review of Systems   Constitutional: Positive for fatigue (sleeping alot )  Negative for appetite change, chills, diaphoresis, fever and unexpected weight change  HENT: Negative for congestion, dental problem, ear pain, nosebleeds, sinus pain, sore throat, trouble swallowing and voice change  Eyes: Negative for photophobia, pain and visual disturbance  Respiratory: Positive for shortness of breath (STEWART)  Negative for choking, chest tightness, wheezing and stridor  Cardiovascular: Negative for chest pain, palpitations and leg swelling  Gastrointestinal: Negative for abdominal pain, blood in stool, constipation, nausea and vomiting  Early satiety with meals     Endocrine: Negative for cold intolerance, heat intolerance, polydipsia and polyphagia  Dry skin, nails   Genitourinary: Negative  Skin: Negative  Neurological: Negative  Hematological: Negative  Psychiatric/Behavioral: Positive for sleep disturbance (sleeping alot)  Negative for agitation, behavioral problems, confusion and suicidal ideas          Increased personal stressors ( mom just recently out of hospital); overwhelmed by all the abnormal findings and necessary tests and referrals  Bipolar stable        Past Medical History:   Diagnosis Date    Asthma     Cancer ([facility] [address] )  Chicken pox     Depression     Headache     Migraines     Vaginal cancer (HCC)     s/p radiation, treated 2014, [facility]     Patient Active Problem List   Diagnosis    Bipolar 2 disorder, major depressive episode ([facility] [address] )    Chronic migraine without aura    Uncontrolled hypertension    Generalized anxiety disorder    Lichen    Tobacco use disorder       Past Surgical History:   Procedure Laterality Date    CARPAL TUNNEL RELEASE Bilateral     HYSTERECTOMY         Social History     Social History    Marital status: Legally      Spouse name: N/A    Number of children: N/A    Years of education: N/A     Social History Main Topics    Smoking status: Current Every Day Smoker     Packs/day: 1 00     Years: 30 00    Smokeless tobacco: Never Used    Alcohol use No    Drug use: No    Sexual activity: Not Asked     Other Topics Concern    None     Social History Narrative    Lives with sister     Unemployed        Family History   Problem Relation Age of Onset    Hypertension Mother     Heart disease Father     Diabetes Father     Hyperlipidemia Father     Heart attack Father     Stroke Maternal Grandfather     Cancer Maternal Uncle     Brain cancer Maternal Uncle     Stroke Paternal Grandmother        Allergies   Allergen Reactions    Norvasc [Amlodipine]      edema         Current Outpatient Prescriptions:     albuterol (2 5 mg/3 mL) 0 083 % nebulizer solution, Take 1 vial (2 5 mg total) by nebulization every 6 (six) hours as needed for wheezing or shortness of breath, Disp: 60 mL, Rfl: 0    albuterol (PROVENTIL HFA,VENTOLIN HFA) 90 mcg/act inhaler, Inhale 2 puffs every 6 (six) hours as needed for wheezing, Disp: 18 g, Rfl: 1    DULoxetine (CYMBALTA) 60 mg delayed release capsule, 1 tab po bid, Disp: , Rfl:     lamoTRIgine (LaMICtal) 100 mg tablet, Take 100 mg by mouth, Disp: , Rfl:     losartan (COZAAR) 100 MG tablet, Take 1 tablet (100 mg total) by mouth daily, Disp: 90 tablet, Rfl: 1    rizatriptan (MAXALT-MLT) 10 MG disintegrating tablet, Take 10 mg by mouth, Disp: , Rfl:     fluticasone (FLOVENT HFA) 220 mcg/act inhaler, Inhale 2 puffs 2 (two) times a day Rinse mouth after use , Disp: 3 Inhaler, Rfl: 1    levothyroxine 125 mcg tablet, Take 1 tablet (125 mcg total) by mouth daily, Disp: 60 tablet, Rfl: 0      Physical Exam:  BP (!) 176/118 (BP Location: Left arm, Patient Position: Sitting, Cuff Size: Standard)   Pulse 92   Temp 97 7 °F (36 5 °C) (Tympanic)   Ht 5' 6 5" (1 689 m)   Wt 79 4 kg (175 lb)   SpO2 99%   BMI 27 82 kg/m²     Physical Exam   Constitutional: She is oriented to person, place, and time  Vital signs are normal  She appears well-developed and well-nourished  No distress  HENT:   Head: Normocephalic and atraumatic  Right Ear: Tympanic membrane, external ear and ear canal normal    Left Ear: Tympanic membrane, external ear and ear canal normal    Nose: Nose normal    Mouth/Throat: Oropharynx is clear and moist    Eyes: Conjunctivae and lids are normal  Pupils are equal, round, and reactive to light  No scleral icterus  Neck: Trachea normal and normal range of motion  Neck supple  No thyromegaly present  Cardiovascular: Normal rate, regular rhythm, S1 normal, S2 normal and intact distal pulses  Exam reveals no gallop  No murmur heard  Pulmonary/Chest: Effort normal  No respiratory distress  She has no wheezes  She has no rhonchi  She has no rales  Decreased air movement   Abdominal: Soft  Normal appearance and bowel sounds are normal    Musculoskeletal: Normal range of motion  She exhibits no edema or deformity  Lymphadenopathy:     She has no cervical adenopathy  Neurological: She is alert and oriented to person, place, and time  She has normal reflexes  No cranial nerve deficit or sensory deficit  Skin: Skin is warm and dry  No rash noted  No cyanosis  No pallor  Nails show no clubbing     Psychiatric: She has a normal mood and affect   Her behavior is normal  Cognition and memory are normal    Overwhelmed but very pleasant          Labs:  Lab Results   Component Value Date    WBC 11 12 (H) 08/09/2018    HGB 14 8 08/09/2018    HCT 45 1 08/09/2018    MCV 90 08/09/2018     08/09/2018     Lab Results   Component Value Date     08/09/2018    K 4 4 08/09/2018     08/09/2018    CO2 28 08/09/2018    ANIONGAP 6 08/09/2018    BUN 19 08/09/2018    CREATININE 1 32 (H) 08/09/2018    GLUF 99 08/09/2018    CALCIUM 9 6 08/09/2018    AST 66 (H) 08/09/2018     (H) 08/09/2018    ALKPHOS 82 08/09/2018    PROT 7 3 08/09/2018    BILITOT 2 22 (H) 08/09/2018    EGFR 48 08/09/2018

## 2018-08-15 NOTE — PATIENT INSTRUCTIONS
Hypothyroidism   AMBULATORY CARE:   Hypothyroidism  is a condition that develops when the thyroid gland does not make enough thyroid hormone  Thyroid hormones help control body temperature, heart rate, growth, and weight  Common signs and symptoms include the following: The signs and symptoms may develop slowly, sometimes over several years  · Exhaustion    · Sensitivity to cold    · Headaches or decreased concentration    · Muscle aches or weakness    · Constipation     · Dry, flaky skin or brittle nails    · Thinning hair    · Heavy or irregular monthly periods    · Depression or irritability  Call 911 for any of the following:   · You have sudden chest pain or shortness of breath  · You have a seizure  · You feel like you are going to faint  Seek care immediately if:   · You have diarrhea, tremors, or trouble sleeping  · Your legs, ankles, or feet are swollen  Contact your healthcare provider if:   · You have a fever  · You have chills, a cough, or feel weak and achy  · You have pain and swelling in your muscles and joints  · Your skin is itchy, swollen, or you have a rash  · Your signs and symptoms return or get worse, even after treatment  · You have questions or concerns about your condition or care  Treatment:  Thyroid hormone replacement medicine may bring your thyroid hormone level back to normal  Ask your healthcare provider for more information on other medicines you may need  Follow up with your healthcare provider as directed: You may need to return for more blood tests to check your thyroid hormone level  This will show if you are getting the right amount of thyroid medicine  Write down your questions so you remember to ask them during your visits  © 2017 [address] [employer]  Information is for End User's use only and may not be sold, redistributed or otherwise used for commercial purposes   All illustrations and images included in CareNotes® are the copyrighted property of [employer]  or Venkata Walker  The above information is an  only  It is not intended as medical advice for individual conditions or treatments  Talk to your doctor, nurse or pharmacist before following any medical regimen to see if it is safe and effective for you  COPD (Chronic Obstructive Pulmonary Disease)   AMBULATORY CARE:   COPD (chronic obstructive pulmonary disease)  is a lung disease that makes it hard for you to breathe  COPD is usually a result of lung damage caused by years of irritation and inflammation  COPD limits air flow in your lungs  Smoking, pollution, genetics, or a history of lung infections can increase your risk for COPD  Common symptoms include the following:   · Shortness of breath     · A dry cough     · Coughing fits that bring up mucus from your lungs     · Wheezing and chest tightness  Call 911 if:   · You feel lightheaded, short of breath, and have chest pain  Seek care immediately if:   · You are confused, dizzy, or feel faint  · Your arm or leg feels warm, tender, and painful  It may look swollen and red  · You cough up blood  Contact your healthcare provider if:   · You have more shortness of breath than usual      · You need more medicine than usual to control your symptoms  · You are coughing or wheezing more than usual      · You are coughing up more mucus, or it is a different color or has a different odor  · You gain more than 3 pounds in a week  · You have a fever, a runny or stuffy nose, and a sore throat, or other cold or flu symptoms  · Your skin, lips, or nails start to turn blue  · You have swelling in your legs or ankles  · You are very tired or weak for more than a day  · You notice changes in your mood, or changes in your ability to think or concentrate  · You have questions or concerns about your condition or care    Treatment for COPD  may include medicines to help decrease swelling and inflammation in your lungs  Medicines may also help open your airways or treat and infection  You may need pulmonary rehabilitation to help you manage your symptoms and improve your quality of life  You may need extra oxygen to help you breathe easier  Help make breathing easier:   · Use pursed-lip breathing any time you feel short of breath  Take a deep breath in through your nose  Slowly breathe out through your mouth with your lips pursed for twice as long as you inhaled  You can also practice this breathing pattern while you bend, lift, climb stairs, or exercise  It slows down your breathing and helps move more air in and out of your lungs  · Do not smoke, and avoid others who smoke  Nicotine and other substances can cause lung irritation or damage and make it harder for you to breathe  Do not use e-cigarettes or smokeless tobacco  They still contain nicotine  Ask your healthcare provider for information if you currently smoke and need help to quit  For support and more information:  ¨ [employer]  Phone: 0- 203 - 790-7984  Web Address: [employer]      · Be aware of and avoid anything that makes your symptoms worse  Stay out of high altitudes and places with high humidity  Stay inside, or cover your mouth and nose with a scarf when you are outside during cold weather  Stay inside on days when air pollution or pollen counts are high  Do not use aerosol sprays such as deodorant, bug spray, and hair spray  Manage COPD and help prevent exacerbations:  COPD is a serious condition that gets worse over time  A COPD exacerbation means your symptoms suddenly get worse  It is important to prevent exacerbations  An exacerbation can cause more lung damage  COPD cannot be cured, but you can take action to feel better and prevent COPD exacerbations:  · Protect yourself from germs  Germs can get into your lungs and cause an infection   An infection in your lungs can create more mucus and make it harder to breathe  An infection can also create swelling in your airways and prevent air from getting in  You can decrease your risk for infection by doing the following:     [facility] your hands often with soap and water  Carry germ-killing gel with you  You can use the gel to clean your hands when soap and water are not available  ¨ Do not touch your eyes, nose, or mouth unless you have washed your hands first      ¨ Always cover your mouth when you cough  Cough into a tissue or your shirtsleeve so you do not spread germs from your hands  ¨ Try to avoid people who have a cold or the flu  If you are sick, stay away from others as much as possible  · Drink more liquids  This will help to keep your air passages moist and help you cough up mucus  Ask how much liquid to drink each day and which liquids are best for you  · Exercise daily  Exercise for at least 20 minutes each day to help increase your energy and decrease shortness of breath  Talk to your healthcare provider about the best exercise plan for you  · Ask about vaccines  Your healthcare provider may recommend that you get regular flu and pneumonia vaccines  Pneumonia can become life-threatening for a person who has COPD  Ask about other vaccines you may need  Ask your healthcare provider about the flu and pneumonia vaccines  All adults should get the flu (influenza) vaccine every year as soon as it becomes available  The pneumonia vaccine is given to adults aged 72 or older to prevent pneumococcal disease, such as pneumonia  Adults aged 23 to 59 years who are at high risk for pneumococcal disease also should get the pneumococcal vaccine  It may need to be repeated 1 or 5 years later  Pulmonary rehabilitation:  Your healthcare provider may recommend a program to help you manage your symptoms and improve your quality of life  It may include nutritional counseling and exercise, such as walking, to strengthen your lungs     Make decisions about your choices for future treatment:  Ask for information about advanced medical directives and living castaneda  These documents help you decide and write down your choices for treatment and end-of-life care  It is best to complete them when you feel well and can think clearly about your wishes  The information can then be kept for future use if you are in the hospital or become very ill  Follow up with your healthcare provider as directed: You may need more tests  Your healthcare provider may refer you to a pulmonary (lung) specialist  Write down your questions so you remember to ask them during your visits  © 2017 [employer] Information is for End User's use only and may not be sold, redistributed or otherwise used for commercial purposes  All illustrations and images included in CareNotes® are the copyrighted property of [employer]  or Venkata Walker  The above information is an  only  It is not intended as medical advice for individual conditions or treatments  Talk to your doctor, nurse or pharmacist before following any medical regimen to see if it is safe and effective for you  Hypertension   AMBULATORY CARE:   Hypertension  is high blood pressure (BP)  Your BP is the force of your blood moving against the walls of your arteries  Normal BP is less than 120/80  Prehypertension is between 120/80 and 139/89  Hypertension is 140/90 or higher  Hypertension causes your BP to get so high that your heart has to work much harder than normal  This can damage your heart  You can control hypertension with a healthy lifestyle or medicines  A controlled blood pressure helps protect your organs, such as your heart, lungs, brain, and kidneys    Common symptoms include the following:   · Headache     · Blurred vision     · Chest pain     · Dizziness or weakness     · Trouble breathing    · Nosebleeds  Call 911 for any of the following:   · You have discomfort in your chest that feels like squeezing, pressure, fullness, or pain  · You become confused or have difficulty speaking  · You suddenly feel lightheaded or have trouble breathing  · You have pain or discomfort in your back, neck, jaw, stomach, or arm  Seek care immediately if:   · You have a severe headache or vision loss  · You have weakness in an arm or leg  Contact your healthcare provider if:   · You feel faint, dizzy, confused, or drowsy  · You have been taking your BP medicine and your BP is still higher than your healthcare provider says it should be  · You have questions or concerns about your condition or care  Treatment for hypertension  may include medicine to lower your BP and lower your cholesterol level  A low cholesterol level helps prevent heart disease and makes it easier to control your blood pressure  You may also need to make lifestyle changes  Take your medicine exactly as directed  Manage hypertension:  Talk with your healthcare provider about these and other ways to manage hypertension:  · Check your BP at home  Sit and rest for 5 minutes before you take your BP  Extend your arm and support it on a flat surface  Your arm should be at the same level as your heart  Follow the directions that came with your BP monitor  If possible, take at least 2 BP readings each time  Take your BP at least twice a day at the same times each day, such as morning and evening  Keep a record of your BP readings and bring it to your follow-up visits  Ask your healthcare provider what your BP should be  · Limit sodium (salt) as directed  Too much sodium can affect your fluid balance  Check labels to find low-sodium or no-salt-added foods  Some low-sodium foods use potassium salts for flavor  Too much potassium can also cause health problems  Your healthcare provider will tell you how much sodium and potassium are safe for you to have in a day   He or she may recommend that you limit sodium to 2,300 mg a day  · Follow the meal plan recommended by your healthcare provider  A dietitian or your provider can give you more information on low-sodium plans or the DASH (Dietary Approaches to Stop Hypertension) eating plan  The DASH plan is low in sodium, unhealthy fats, and total fat  It is high in potassium, calcium, and fiber  · Exercise to maintain a healthy weight  Exercise at least 30 minutes per day, on most days of the week  This will help decrease your blood pressure  Ask your healthcare provider about the best exercise plan for you  · Decrease stress  This may help lower your BP  Learn ways to relax, such as deep breathing or listening to music  · Limit alcohol  Women should limit alcohol to 1 drink a day  Men should limit alcohol to 2 drinks a day  A drink of alcohol is 12 ounces of beer, 5 ounces of wine, or 1½ ounces of liquor  · Do not smoke  Nicotine and other chemicals in cigarettes and cigars can increase your BP and also cause lung damage  Ask your healthcare provider for information if you currently smoke and need help to quit  E-cigarettes or smokeless tobacco still contain nicotine  Talk to your healthcare provider before you use these products  · Manage any other health conditions you have  Health conditions such as diabetes can increase your risk for hypertension  Follow your healthcare provider's instructions and take all your medicines as directed  Follow up with your healthcare provider as directed: You will need to return to have your BP checked and to have other lab tests done  Write down your questions so you remember to ask them during your visits  © 2017 [address] [employer]  Information is for End User's use only and may not be sold, redistributed or otherwise used for commercial purposes  All illustrations and images included in CareNotes® are the copyrighted property of [employer]  or Venkata Walker    The above information is an  only  It is not intended as medical advice for individual conditions or treatments  Talk to your doctor, nurse or pharmacist before following any medical regimen to see if it is safe and effective for you  Heart Failure   AMBULATORY CARE:   Heart failure (HF) is a condition that does not allow your heart to fill or pump properly  Not enough oxygen in your blood gets to your organs and tissues  HF can occur in the right side, the left side, or both lower chambers of your heart  HF is often caused by damage or injury to your heart  The damage may be caused by heart attack, other heart conditions, or high blood pressure  HF is a long-term condition that tends to get worse over time  It is important to manage your health to improve your quality of life  HF can be worsened by heavy alcohol use, smoking, diabetes that is not controlled, or obesity  Common signs and symptoms:   · Difficulty breathing with activity that worsens to difficulty breathing at rest    · Shortness of breath while lying flat    · Severe shortness of breath and coughing at night that usually wakes you     · Chest pain at night    · Periods of no breathing, then breathing fast    · Fatigue or lack of energy (often worsened by physical activity)     · Swelling in your ankles, legs, or abdomen    · Fast heartbeat, purple color around your mouth and nailbeds    · Fingers and toes cool to the touch  Call 911 if:   · You have any of the following signs of a heart attack:      ¨ Squeezing, pressure, or pain in your chest that lasts longer than 5 minutes or returns    ¨ Discomfort or pain in your back, neck, jaw, stomach, or arm     ¨ Trouble breathing    ¨ Nausea or vomiting    ¨ Lightheadedness or a sudden cold sweat, especially with chest pain or trouble breathing    Seek care immediately if:   · You gain 3 or more pounds (1 4 kg) in a day, or more than your healthcare provider says you should       · Your heartbeat is fast, slow, or uneven all the time  Contact your healthcare provider if:   · You have symptoms of worsening HF:      ¨ Shortness of breath at rest, at night, or that is getting worse in any way     ¨ Weight gain of 5 or more pounds (2 2 kg) in a week     ¨ More swelling in your legs or ankles     ¨ Abdominal pain or swelling     ¨ More coughing     ¨ Loss of appetite     ¨ Feeling tired all the time    · You feel hopeless or depressed, or you have lost interest in things you used to enjoy  · You often feel worried or afraid  · You have questions or concerns about your condition or care  Treatment for HF  may include any of the following:  · Medicines  may be needed to help regulate your heart rhythm  You may also need medicines to lower your blood pressure, and to get rid of extra fluids  · Oxygen  may help you breathe easier if your oxygen level is lower than normal  A CPAP machine may be used to keep your airway open while you sleep  · Surgery  can be done to implant a pacemaker in your chest to regulate your heart rhythm  Other types of surgery can open blocked heart vessels, replace a damaged heart valve, or remove scar tissue  Manage or prevent HF:   · Do not smoke  Nicotine and other chemicals in cigarettes and cigars can cause lung damage and make HF difficult to manage  Ask your healthcare provider for information if you currently smoke and need help to quit  E-cigarettes or smokeless tobacco still contain nicotine  Talk to your healthcare provider before you use these products  · Do not drink alcohol or take illegal drugs  Alcohol and drugs can worsen your symptoms quickly  · Weigh yourself every morning  Use the same scale, in the same spot  Do this after you use the bathroom, but before you eat or drink anything  Wear the same type of clothing  Do not wear shoes  Record your weight each day so you will notice any sudden weight gain   Swelling and weight gain are signs of fluid retention  If you are overweight, ask how to lose weight safely  · Check your blood pressure and heart rate every day  Ask for more information about how to measure your blood pressure and heart rate correctly  Ask what these numbers should be for you  · Manage any chronic health conditions you have  These include high blood pressure, diabetes, obesity, high cholesterol, metabolic syndrome, and COPD  You will have fewer symptoms if you manage these health conditions  Follow your healthcare provider's recommendations and follow up with him or her regularly  · Eat heart-healthy foods and limit sodium (salt)  An easy way to do this is to eat more fresh fruits and vegetables and fewer canned and processed foods  Replace butter and margarine with heart-healthy oils such as olive oil and canola oil  Other heart-healthy foods include walnuts, whole-grain breads, low-fat dairy products, beans, and lean meats  Fatty fish such as salmon and tuna are also heart healthy  Ask how much salt you can eat each day  Do not use salt substitutes  · Drink liquids as directed  You may need to limit the amount of liquids you drink if you retain fluid  Ask how much liquid to drink each day and which liquids are best for you  · Stay active  If you are not active, your symptoms are likely to worsen quickly  Walking, bicycling, and other types of physical activity help maintain your strength and improve your mood  Physical activity also helps you manage your weight  Work with your healthcare provider to create an exercise plan that is right for you  · Get vaccines as directed  Get a flu shot every year  You may also need the pneumonia vaccine  The flu and pneumonia can be severe for a person who has HF  Vaccines protect you from these infections  Join a support group:  Living with HF can be difficult  It may be helpful to talk with others who have HF   You may learn how to better manage your condition or get emotional support  For more information:   · [address]  [place] , [place]   Phone: 7- 584 - 982-7792  Web Address: [email or web address]   Follow up with your healthcare provider or cardiologist within 2 weeks or as directed: You may need to return for other tests  You may need home health care  A healthcare provider will monitor your vital signs, weight, and make sure your medicines are working  Write down your questions so you remember to ask them during your visits  © 2017 [facility] Information is for End User's use only and may not be sold, redistributed or otherwise used for commercial purposes  All illustrations and images included in CareNotes® are the copyrighted property of [employer]  or Venkata Walker  The above information is an  only  It is not intended as medical advice for individual conditions or treatments  Talk to your doctor, nurse or pharmacist before following any medical regimen to see if it is safe and effective for you

## 2018-08-15 NOTE — LETTER
August 15, 2018     Mirta Wiggins, [address] [facility] [address]    Patient: Maxi Diaz   YOB: 1970   Date of Visit: 8/15/2018       Dear Dr Megan Perkins Recipients: Thank you for referring Yen Jason to me for evaluation  Please be aware of below  If you have questions, please do not hesitate to call me  I look forward to following your patient along with you  Sincerely,        JORDAN Davis PA-C  8/15/2018 12:44 PM  Incomplete  Routine Follow-up    Maxi Diaz 56 y o  female   Date:  8/15/2018      Assessment and Plan:    Alessia Irwin was seen today for follow-up  Diagnoses and all orders for this visit:    Hypothyroidism, unspecified type  -    Start levothyroxine 125 mcg tablet; Take 1 tablet (125 mcg total) by mouth daily  -     TSH, 3rd generation; Future  -     T4, free; Future  -     Thyroid Antibodies Panel; Future  -     US thyroid; Future  - recheck thyroid labs in 6 weeks and adjust prn    Tobacco use disorder  -     fluticasone (FLOVENT HFA) 220 mcg/act inhaler; Inhale 2 puffs 2 (two) times a day Rinse mouth after use  - continue smoking cessation; she has decreased to 2 cigs per day   - avoid wellbutrin and chantix with b    Moderate persistent asthma without complication    Essential hypertension  -     losartan (COZAAR) 100 MG tablet; Take 1 tablet (100 mg total) by mouth daily  -     Echo complete with contrast if indicated; Future  -     Ambulatory referral to Cardiology; Future  -     Comprehensive metabolic panel; Future  -     CBC and differential; Future    Chronic obstructive pulmonary disease, unspecified COPD type (HCC)  -     fluticasone (FLOVENT HFA) 220 mcg/act inhaler; Inhale 2 puffs 2 (two) times a day Rinse mouth after use  Elevated brain natriuretic peptide (BNP) level  -     Echo complete with contrast if indicated; Future  -     Ambulatory referral to Cardiology;  Future    Abnormal EKG  -     Echo complete with contrast if indicated; Future  -     Ambulatory referral to Cardiology; Future    STEWART (dyspnea on exertion)  -     Ambulatory referral to Cardiology; Future    Transaminitis  -     US abdomen complete; Future    Elevated bilirubin  -     US abdomen complete; Future    Early satiety  -     US abdomen complete; Future    Elevated serum creatinine  -     Comprehensive metabolic panel; Future    Leukocytosis, unspecified type  -     CBC and differential; Future    Pure hypercholesterolemia            HPI:  Chief Complaint   Patient presents with    Follow-up     labs in chart/chest xray/ekg in chart     HPI  Patient is a 55 yo female who presents to recheck BP and numerous abnormalities on testing and lab work  She just establish with out office less than 1 month ago and has been started on BP medicine due to elevated BP  She was recently increased from 25 mg to 50 mg losartan and BP still elevated  She is tolerating well  She is extremely stressed today because she is overwhelmed by everything  She has cut back to 2 cigs per day  She is still have difficulty with breathing on exertion  She is taking flovent twice daily and needing prn albuterol inhaler about once to twice daily  She still has not been able to obtain nebulizer machine  Her chest xray was normal but her EKG had several abnormalities  She still have not been able to go for her PFTs  She should have CT chest as well but is very overwhelmed for the amount of tests she is requiring  She did go for bloodwork and had several abnormalities including: elevated BNP, POLO vs CKD with elevated Cr and reduced GFR, transaminitis and elevated bilirubin, very high tsh and low t4, elevated LDL/total cholesterol  She does follow up with Dr Anamaria Raymundo with psychiatry regularly and has been on these long term psych medications for awhile without any adjustments recently       ROS: Review of Systems   Constitutional: Positive for fatigue (sleeping alot )  Negative for appetite change, chills, diaphoresis, fever and unexpected weight change  HENT: Negative for congestion, dental problem, ear pain, nosebleeds, sinus pain, sore throat, trouble swallowing and voice change  Eyes: Negative for photophobia, pain and visual disturbance  Respiratory: Positive for shortness of breath (STEWART)  Negative for choking, chest tightness, wheezing and stridor  Cardiovascular: Negative for chest pain, palpitations and leg swelling  Gastrointestinal: Negative for abdominal pain, blood in stool, constipation, nausea and vomiting  Early satiety with meals     Endocrine: Negative for cold intolerance, heat intolerance, polydipsia and polyphagia  Dry skin, nails   Genitourinary: Negative  Skin: Negative  Neurological: Negative  Hematological: Negative  Psychiatric/Behavioral: Positive for sleep disturbance (sleeping alot)  Negative for agitation, behavioral problems, confusion and suicidal ideas          Increased personal stressors ( mom just recently out of hospital); overwhelmed by all the abnormal findings and necessary tests and referrals  Bipolar stable        Past Medical History:   Diagnosis Date    Asthma     Cancer ([facility] )     Chicken pox     Depression     Headache     Migraines     Vaginal cancer ([facility] )     s/p radiation, treated 2014, [facility]     Patient Active Problem List   Diagnosis    Bipolar 2 disorder, major depressive episode ([facility] )    Chronic migraine without aura    Uncontrolled hypertension    Generalized anxiety disorder    Lichen    Tobacco use disorder       Past Surgical History:   Procedure Laterality Date    CARPAL TUNNEL RELEASE Bilateral     HYSTERECTOMY         Social History     Social History    Marital status: Legally      Spouse name: N/A    Number of children: N/A    Years of education: N/A     Social History Main Topics    Smoking status: Current Every Day Smoker     Packs/day: 1 00 Years: 30 00    Smokeless tobacco: Never Used    Alcohol use No    Drug use: No    Sexual activity: Not Asked     Other Topics Concern    None     Social History Narrative    Lives with sister     Unemployed        Family History   Problem Relation Age of Onset    Hypertension Mother     Heart disease Father     Diabetes Father     Hyperlipidemia Father     Heart attack Father     Stroke Maternal Grandfather     Cancer Maternal Uncle     Brain cancer Maternal Uncle     Stroke Paternal Grandmother        Allergies   Allergen Reactions    Norvasc [Amlodipine]      edema         Current Outpatient Prescriptions:     albuterol (2 5 mg/3 mL) 0 083 % nebulizer solution, Take 1 vial (2 5 mg total) by nebulization every 6 (six) hours as needed for wheezing or shortness of breath, Disp: 60 mL, Rfl: 0    albuterol (PROVENTIL HFA,VENTOLIN HFA) 90 mcg/act inhaler, Inhale 2 puffs every 6 (six) hours as needed for wheezing, Disp: 18 g, Rfl: 1    DULoxetine (CYMBALTA) 60 mg delayed release capsule, 1 tab po bid, Disp: , Rfl:     lamoTRIgine (LaMICtal) 100 mg tablet, Take 100 mg by mouth, Disp: , Rfl:     losartan (COZAAR) 100 MG tablet, Take 1 tablet (100 mg total) by mouth daily, Disp: 90 tablet, Rfl: 1    rizatriptan (MAXALT-MLT) 10 MG disintegrating tablet, Take 10 mg by mouth, Disp: , Rfl:     fluticasone (FLOVENT HFA) 220 mcg/act inhaler, Inhale 2 puffs 2 (two) times a day Rinse mouth after use , Disp: 3 Inhaler, Rfl: 1    levothyroxine 125 mcg tablet, Take 1 tablet (125 mcg total) by mouth daily, Disp: 60 tablet, Rfl: 0      Physical Exam:  BP (!) 176/118 (BP Location: Left arm, Patient Position: Sitting, Cuff Size: Standard)   Pulse 92   Temp 97 7 °F (36 5 °C) (Tympanic)   Ht 5' 6 5" (1 689 m)   Wt 79 4 kg (175 lb)   SpO2 99%   BMI 27 82 kg/m²      Physical Exam   Constitutional: She is oriented to person, place, and time   Vital signs are normal  She appears well-developed and well-nourished  No distress  HENT:   Head: Normocephalic and atraumatic  Right Ear: Tympanic membrane, external ear and ear canal normal    Left Ear: Tympanic membrane, external ear and ear canal normal    Nose: Nose normal    Mouth/Throat: Oropharynx is clear and moist    Eyes: Conjunctivae and lids are normal  Pupils are equal, round, and reactive to light  No scleral icterus  Neck: Trachea normal and normal range of motion  Neck supple  No thyromegaly present  Cardiovascular: Normal rate, regular rhythm, S1 normal, S2 normal and intact distal pulses  Exam reveals no gallop  No murmur heard  Pulmonary/Chest: Effort normal  No respiratory distress  She has no wheezes  She has no rhonchi  She has no rales  Decreased air movement   Abdominal: Soft  Normal appearance and bowel sounds are normal    Musculoskeletal: Normal range of motion  She exhibits no edema or deformity  Lymphadenopathy:     She has no cervical adenopathy  Neurological: She is alert and oriented to person, place, and time  She has normal reflexes  No cranial nerve deficit or sensory deficit  Skin: Skin is warm and dry  No rash noted  No cyanosis  No pallor  Nails show no clubbing  Psychiatric: She has a normal mood and affect   Her behavior is normal  Cognition and memory are normal    Overwhelmed but very pleasant          Labs:  Lab Results   Component Value Date    WBC 11 12 (H) 08/09/2018    HGB 14 8 08/09/2018    HCT 45 1 08/09/2018    MCV 90 08/09/2018     08/09/2018     Lab Results   Component Value Date     08/09/2018    K 4 4 08/09/2018     08/09/2018    CO2 28 08/09/2018    ANIONGAP 6 08/09/2018    BUN 19 08/09/2018    CREATININE 1 32 (H) 08/09/2018    GLUF 99 08/09/2018    CALCIUM 9 6 08/09/2018    AST 66 (H) 08/09/2018     (H) 08/09/2018    ALKPHOS 82 08/09/2018    PROT 7 3 08/09/2018    BILITOT 2 22 (H) 08/09/2018    EGFR 48 08/09/2018

## 2018-08-22 ENCOUNTER — OFFICE VISIT (OUTPATIENT)
Dept: FAMILY MEDICINE CLINIC | Facility: CLINIC | Age: 48
End: 2018-08-22
Payer: COMMERCIAL

## 2018-08-22 VITALS
SYSTOLIC BLOOD PRESSURE: 164 MMHG | WEIGHT: 173 LBS | DIASTOLIC BLOOD PRESSURE: 110 MMHG | HEART RATE: 107 BPM | TEMPERATURE: 98.4 F | OXYGEN SATURATION: 99 % | BODY MASS INDEX: 27.15 KG/M2 | HEIGHT: 67 IN

## 2018-08-22 DIAGNOSIS — R00.0 SINUS TACHYCARDIA: ICD-10-CM

## 2018-08-22 DIAGNOSIS — J44.9 CHRONIC OBSTRUCTIVE PULMONARY DISEASE, UNSPECIFIED COPD TYPE (HCC): ICD-10-CM

## 2018-08-22 DIAGNOSIS — I10 UNCONTROLLED HYPERTENSION: Primary | ICD-10-CM

## 2018-08-22 PROCEDURE — 4004F PT TOBACCO SCREEN RCVD TLK: CPT | Performed by: PHYSICIAN ASSISTANT

## 2018-08-22 PROCEDURE — 3008F BODY MASS INDEX DOCD: CPT | Performed by: PHYSICIAN ASSISTANT

## 2018-08-22 PROCEDURE — 99214 OFFICE O/P EST MOD 30 MIN: CPT | Performed by: PHYSICIAN ASSISTANT

## 2018-08-22 RX ORDER — METOPROLOL SUCCINATE 25 MG/1
25 TABLET, EXTENDED RELEASE ORAL DAILY
Qty: 30 TABLET | Refills: 0 | Status: SHIPPED | OUTPATIENT
Start: 2018-08-22 | End: 2018-11-20

## 2018-08-22 NOTE — PROGRESS NOTES
Routine Follow-up    Flor Anderson 53 y o  female   Date:  8/22/2018      Assessment and Plan:    Kristyn Castañeda was seen today for follow-up  Diagnoses and all orders for this visit:    Uncontrolled hypertension  -     metoprolol succinate (TOPROL-XL) 25 mg 24 hr tablet; Take 1 tablet (25 mg total) by mouth daily  - continue losartan 100 mg but will start BB due elevated HR and hx of abnormal bnp with concern for HF; suspect COPD but not confirmed with testing but will stop if COPD flare  - schedule with cardiology, obtain echo    Sinus tachycardia  -     metoprolol succinate (TOPROL-XL) 25 mg 24 hr tablet; Take 1 tablet (25 mg total) by mouth daily  - recent EKG NSR  - likely due to stress and albuterol neb use   - obtain echo     Chronic obstructive pulmonary disease, unspecified COPD type (HCC)  -     ipratropium (ATROVENT) 0 02 % nebulizer solution; Take 1 vial (0 5 mg total) by nebulization every 8 (eight) hours as needed for wheezing or shortness of breath for up to 90 days; will switch from albuterol             HPI:  Chief Complaint   Patient presents with    Follow-up     Pt is here for a 1 week f/u exam to recheck blood pressure  HPI  Patient is a 54 yo female who presents for 1 week follow up of BP check  Please see 2 prior office notes for multitude of medical problems  At last week's visit, her losartan was increased to 100 mg but BP still elevated  She was given orders for echo, thyroid ultrasound, us abd, PFTs and are all still outstanding to be scheduled  She just realized that she had a missed voicemail from cardiology to get scheduled with their office which she will do today  She is tolerating new synthroid medication  She also has needed to use her nebulizer at least twice daily with humidity and has been making her feel rammy and now does have increased HR at today's visit  Strongly suspect COPD but no tests done to confirm  ROS: Review of Systems   Constitutional: Negative      Respiratory: Positive for shortness of breath (STEWART)  Negative for cough, chest tightness, wheezing and stridor  Cardiovascular: Negative for chest pain, palpitations and leg swelling  Musculoskeletal: Negative  Skin: Negative  Neurological: Negative for dizziness, seizures, syncope, facial asymmetry, weakness, light-headedness, numbness and headaches  Hematological: Negative  Psychiatric/Behavioral: Negative for dysphoric mood and sleep disturbance  The patient is nervous/anxious (all abnormal medical findings making her anxious)          Past Medical History:   Diagnosis Date    Asthma     Cancer ([facility] )     Chicken pox     Depression     Headache     Migraines     Vaginal cancer (HCC)     s/p radiation, treated 2014, [facility]     Patient Active Problem List   Diagnosis    Bipolar 2 disorder, major depressive episode ([facility] )    Chronic migraine without aura    Uncontrolled hypertension    Generalized anxiety disorder    Lichen    Tobacco use disorder       Past Surgical History:   Procedure Laterality Date    CARPAL TUNNEL RELEASE Bilateral     HYSTERECTOMY         Social History     Social History    Marital status: Legally      Spouse name: N/A    Number of children: N/A    Years of education: N/A     Social History Main Topics    Smoking status: Current Every Day Smoker     Packs/day: 1 00     Years: 30 00    Smokeless tobacco: Never Used    Alcohol use Yes      Comment: rarely    Drug use: No    Sexual activity: Not Asked     Other Topics Concern    None     Social History Narrative    Lives with sister     Unemployed        Family History   Problem Relation Age of Onset    Hypertension Mother     Heart disease Father     Diabetes Father     Hyperlipidemia Father     Heart attack Father     Stroke Maternal Grandfather     Cancer Maternal Uncle     Brain cancer Maternal Uncle     Stroke Paternal Grandmother        Allergies   Allergen Reactions    Norvasc [Amlodipine] edema         Current Outpatient Prescriptions:     albuterol (2 5 mg/3 mL) 0 083 % nebulizer solution, Take 1 vial (2 5 mg total) by nebulization every 6 (six) hours as needed for wheezing or shortness of breath, Disp: 60 mL, Rfl: 0    albuterol (PROVENTIL HFA,VENTOLIN HFA) 90 mcg/act inhaler, Inhale 2 puffs every 6 (six) hours as needed for wheezing, Disp: 18 g, Rfl: 1    DULoxetine (CYMBALTA) 60 mg delayed release capsule, 1 tab po bid, Disp: , Rfl:     fluticasone (FLOVENT HFA) 220 mcg/act inhaler, Inhale 2 puffs 2 (two) times a day Rinse mouth after use , Disp: 3 Inhaler, Rfl: 1    lamoTRIgine (LaMICtal) 100 mg tablet, Take 100 mg by mouth, Disp: , Rfl:     levothyroxine 125 mcg tablet, Take 1 tablet (125 mcg total) by mouth daily, Disp: 60 tablet, Rfl: 0    losartan (COZAAR) 100 MG tablet, Take 1 tablet (100 mg total) by mouth daily, Disp: 90 tablet, Rfl: 1    rizatriptan (MAXALT-MLT) 10 MG disintegrating tablet, Take 10 mg by mouth, Disp: , Rfl:     ipratropium (ATROVENT) 0 02 % nebulizer solution, Take 1 vial (0 5 mg total) by nebulization every 8 (eight) hours as needed for wheezing or shortness of breath for up to 90 days, Disp: 75 mL, Rfl: 0    metoprolol succinate (TOPROL-XL) 25 mg 24 hr tablet, Take 1 tablet (25 mg total) by mouth daily, Disp: 30 tablet, Rfl: 0      Physical Exam:  BP (!) 164/110 (BP Location: Left arm, Patient Position: Sitting, Cuff Size: Standard)   Pulse (!) 107   Temp 98 4 °F (36 9 °C) (Tympanic)   Ht 5' 6 5" (1 689 m)   Wt 78 5 kg (173 lb)   SpO2 99%   BMI 27 50 kg/m²     Physical Exam   Constitutional: She is oriented to person, place, and time  She appears well-developed and well-nourished  No distress  HENT:   Head: Normocephalic and atraumatic  Right Ear: External ear normal    Left Ear: External ear normal    Mouth/Throat: Oropharynx is clear and moist    Neck: Normal range of motion  Neck supple  No JVD present     Cardiovascular: Regular rhythm and intact distal pulses  HR 90-100s   Pulmonary/Chest: Effort normal  No respiratory distress  She has no wheezes  She has no rales  Decreased breath sounds   Musculoskeletal: Normal range of motion  She exhibits no edema or deformity  Lymphadenopathy:     She has no cervical adenopathy  Neurological: She is alert and oriented to person, place, and time  Skin: Skin is warm and dry  No rash noted  She is not diaphoretic  No pallor  Psychiatric: She has a normal mood and affect           Labs:  Lab Results   Component Value Date    WBC 11 12 (H) 08/09/2018    HGB 14 8 08/09/2018    HCT 45 1 08/09/2018    MCV 90 08/09/2018     08/09/2018     Lab Results   Component Value Date     08/09/2018    K 4 4 08/09/2018     08/09/2018    CO2 28 08/09/2018    ANIONGAP 6 08/09/2018    BUN 19 08/09/2018    CREATININE 1 32 (H) 08/09/2018    GLUF 99 08/09/2018    CALCIUM 9 6 08/09/2018    AST 66 (H) 08/09/2018     (H) 08/09/2018    ALKPHOS 82 08/09/2018    PROT 7 3 08/09/2018    BILITOT 2 22 (H) 08/09/2018    EGFR 48 08/09/2018

## 2018-08-22 NOTE — PATIENT INSTRUCTIONS
Please use atrovent nebulizer instead of albuterol to help prevent fast heart rate  Please continue to try and get scheduled with cardiology and for outstanding testing  How to Stop Smoking   AMBULATORY CARE:   You will improve your health and the health of others around you  if you stop smoking  Your risk for heart and lung disease, cancer, stroke, heart attack, and vision problems will also decrease  You can benefit from quitting no matter how long you have smoked  Prepare to stop smoking:  Nicotine is a highly addictive drug found in cigarettes  Withdrawal symptoms can happen when you stop smoking and make it hard to quit  These include anxiety, depression, irritability, trouble sleeping, and increased appetite  You increase your chances of success if you prepare to quit  · Set a quit date  Luisito Laurent a date that is within the next 2 weeks  Do not pick a day that you think may be stressful or busy  Write down the day or [place] it on your calender  · Tell friends and family that you plan to quit  Explain that you may have withdrawal symptoms when you try to quit  Ask them to support you  They may be able to encourage you and help reduce your stress to make it easier for you to quit  · Make a list of your reasons for quitting  Put the list somewhere you will see it every day, such as your refrigerator  You can look at the list when you have a craving  · Remove all tobacco and nicotine products from your home, car, and workplace  Also, remove anything else that will tempt you to smoke, such as lighters, matches, or ashtrays  Clean your car, home, and places at work that smell like smoke  The smell of smoke can trigger a craving  · Identify triggers that make you want to smoke  This may include activities, feelings, or people  Also write down 1 way you can deal with each of your triggers   For example, if you want to smoke as soon as you wake up, plan another activity during this time, such as exercise  · Make a plan for how you will quit  Learn about the tools that can help you quit, such as medicine, counseling, or nicotine replacement therapy  Choose at least 2 options to help you quit  Tools to help you stop smoking:   · Counseling  from a trained healthcare provider can provide you with support and skills to quit smoking  The provider will also teach you to manage your withdrawal symptoms and cravings  You may receive counseling from one counselor, in group therapy, or through phone therapy called a quit line  · Nicotine replacement therapy (NRT)  such as nicotine patches, gum, or lozenges may help reduce your nicotine cravings  You may get these without a doctor's order  Do not use e-cigarettes or smokeless tobacco in place of cigarettes or to help you quit  They still contain nicotine  · Prescription medicines  such as nasal sprays or nicotine inhalers may help reduce your withdrawal symptoms  Other medicines may also be used to reduce your urge to smoke  Ask your healthcare provider about these medicines  You may need to start certain medicines 2 weeks before your quit date for them to work well  · Hypnosis  is a practice that helps guide you through thoughts and feelings  Hypnosis may help decrease your cravings and make you more willing to quit  · Acupuncture therapy  uses very thin needles to balance energy channels in the body  This is thought to help decrease cravings and symptoms of nicotine withdrawal      · Support groups  let you talk to others who are trying to quit or have already quit  It may be helpful to speak with others about how they quit  Manage your cravings:   · Avoid situations, people, and places that tempt you to smoke  Go to nonsmoking places, such as libraries or restaurants  Understand what tempts you and try to avoid these things  · Keep your hands busy  Hold things such as a stress ball or pen  · Put candy or toothpicks in your mouth  Keep lollipops, sugarless gum, or toothpicks with you at all times  · Do not have alcohol or caffeine  These drinks may tempt you to smoke  Drink healthy liquids such as water or juice instead  · Reward yourself when you resist your cravings  Rewards will motivate you and help you stay positive  · Do an activity that distracts you from your craving  Examples include going for a walk, exercising, or cleaning  Prevent weight gain after you quit:  You may gain a few pounds after you quit smoking  It is healthier for you to gain a few pounds than to continue to smoke  The following can help you prevent weight gain:  · Eat healthy foods  These include fruits, vegetables, whole-grain breads, low-fat dairy products, beans, lean meats, and fish  Eat healthy snacks, such as low-fat yogurt, if you get hungry between meals  · Drink water before, during, and between meals  This will make your stomach feel full and help prevent you from overeating  Ask your healthcare provider how much liquid to drink each day and which liquids are best for you  · Exercise  Take a walk or do some kind of exercise every day  Ask your healthcare provider what exercise is right for you  This may help reduce your cravings and reduce stress  For more support and information:   · Smokefree  gov  Phone: 3- 642 - 554-8495  Web Address: www [employer]  © 2017 [employer] Information is for End User's use only and may not be sold, redistributed or otherwise used for commercial purposes  All illustrations and images included in CareNotes® are the copyrighted property of [employer]  or Venkata Walker  The above information is an  only  It is not intended as medical advice for individual conditions or treatments  Talk to your doctor, nurse or pharmacist before following any medical regimen to see if it is safe and effective for you

## 2018-09-11 RX ORDER — FUROSEMIDE 20 MG/1
20 TABLET ORAL
COMMUNITY
Start: 2018-09-05 | End: 2018-11-20

## 2018-09-11 RX ORDER — ATORVASTATIN CALCIUM 40 MG/1
40 TABLET, FILM COATED ORAL
COMMUNITY
Start: 2018-09-05

## 2018-09-11 RX ORDER — HYDRALAZINE HYDROCHLORIDE 25 MG/1
25 TABLET, FILM COATED ORAL
COMMUNITY
Start: 2018-09-05 | End: 2018-11-20

## 2018-10-18 DIAGNOSIS — E03.9 HYPOTHYROIDISM, UNSPECIFIED TYPE: ICD-10-CM

## 2018-10-18 RX ORDER — LEVOTHYROXINE SODIUM 0.12 MG/1
125 TABLET ORAL DAILY
Qty: 90 TABLET | Refills: 0 | Status: SHIPPED | OUTPATIENT
Start: 2018-10-18 | End: 2019-01-09 | Stop reason: SDUPTHER

## 2018-10-22 RX ORDER — METOPROLOL SUCCINATE 100 MG/1
100 TABLET, EXTENDED RELEASE ORAL EVERY EVENING
Refills: 0 | COMMUNITY
Start: 2018-10-08

## 2018-10-22 RX ORDER — NICOTINE 14MG/24HR
1 PATCH, TRANSDERMAL 24 HOURS TRANSDERMAL DAILY
Refills: 0 | COMMUNITY
Start: 2018-09-24 | End: 2018-11-20

## 2018-10-22 RX ORDER — METOPROLOL SUCCINATE 50 MG/1
TABLET, EXTENDED RELEASE ORAL
Refills: 0 | COMMUNITY
Start: 2018-09-24 | End: 2018-11-20

## 2018-10-22 RX ORDER — SPIRONOLACTONE 25 MG/1
25 TABLET ORAL DAILY
Refills: 0 | COMMUNITY
Start: 2018-09-10 | End: 2018-11-20

## 2018-11-20 ENCOUNTER — OFFICE VISIT (OUTPATIENT)
Dept: FAMILY MEDICINE CLINIC | Facility: CLINIC | Age: 48
End: 2018-11-20
Payer: COMMERCIAL

## 2018-11-20 VITALS
DIASTOLIC BLOOD PRESSURE: 84 MMHG | TEMPERATURE: 97.4 F | SYSTOLIC BLOOD PRESSURE: 122 MMHG | OXYGEN SATURATION: 96 % | HEART RATE: 67 BPM | BODY MASS INDEX: 28.16 KG/M2 | WEIGHT: 169 LBS | RESPIRATION RATE: 16 BRPM | HEIGHT: 65 IN

## 2018-11-20 DIAGNOSIS — G43.709 CHRONIC MIGRAINE WITHOUT AURA WITHOUT STATUS MIGRAINOSUS, NOT INTRACTABLE: Primary | ICD-10-CM

## 2018-11-20 DIAGNOSIS — Z71.6 ENCOUNTER FOR TOBACCO USE CESSATION COUNSELING: ICD-10-CM

## 2018-11-20 PROBLEM — R79.89 ELEVATED LFTS: Status: ACTIVE | Noted: 2018-08-31

## 2018-11-20 PROBLEM — K80.20 CALCULUS OF GALLBLADDER WITHOUT CHOLECYSTITIS: Status: ACTIVE | Noted: 2018-08-31

## 2018-11-20 PROBLEM — I50.21 ACUTE SYSTOLIC CHF (CONGESTIVE HEART FAILURE) (HCC): Status: ACTIVE | Noted: 2018-09-02

## 2018-11-20 PROBLEM — R17 SERUM TOTAL BILIRUBIN ELEVATED: Status: ACTIVE | Noted: 2018-08-31

## 2018-11-20 PROCEDURE — 3008F BODY MASS INDEX DOCD: CPT | Performed by: PHYSICIAN ASSISTANT

## 2018-11-20 PROCEDURE — 99214 OFFICE O/P EST MOD 30 MIN: CPT | Performed by: PHYSICIAN ASSISTANT

## 2018-11-20 RX ORDER — RIZATRIPTAN BENZOATE 10 MG/1
10 TABLET, ORALLY DISINTEGRATING ORAL ONCE AS NEEDED
Qty: 10 TABLET | Refills: 0 | Status: SHIPPED | OUTPATIENT
Start: 2018-11-20

## 2018-11-20 RX ORDER — NICOTINE 21 MG/24HR
1 PATCH, TRANSDERMAL 24 HOURS TRANSDERMAL EVERY 24 HOURS
Qty: 28 PATCH | Refills: 0 | Status: SHIPPED | OUTPATIENT
Start: 2018-11-20 | End: 2019-11-21

## 2018-11-20 NOTE — PROGRESS NOTES
Routine Follow-up    Celestino Rueda 53 y o  female   Date:  11/20/2018      Assessment and Plan:    Mihai Elam was seen today for follow-up  Diagnoses and all orders for this visit:    Chronic migraine without aura without status migrainosus, not intractable  -     rizatriptan (MAXALT-MLT) 10 MG disintegrating tablet; Take 1 tablet (10 mg total) by mouth once as needed for migraine for up to 1 dose  - continue Cymbalta, quit smoking   -     Ambulatory referral to Neurology; Future    Encounter for tobacco use cessation counseling  -     nicotine (NICODERM CQ) 21 mg/24 hr TD 24 hr patch; Place 1 patch on the skin every 24 hours  - please do not smoke while using, set quit date     Please continue follow with cardiology, labs outstanding to include cmp, cbc, hga1c, lipid and repeat echo      HPI:  Chief Complaint   Patient presents with    Follow-up     HPI   Patient is a 54 yo female who presents for follow up and neurology referral  She had missed two follow up appt in our office  But since last visit, she was seen in the hospital and was treated for acute CHF and is following with cardiology closely with much improvement  She also had gallstones s/p MRCP and was instructed to follow up with gen surg after stabilization of CHF  She has not been experiencing indigestion or RUQ pain  Since that hospitalization, her breathing had improved and has been off atrovent since hospitalization, not needing rescue inhaler either  She is however still smoking back to 1 ppd, has been out of nicotine patches  She is going for repeat blood work for cardiology and repeat echo in next new weeks  She feels much better  However, she has had chronic migraines for which she followed with [facility] last in 2014 s/p botox injections  She is still on Cymbalta and has been out of her maxalt  She has a scheduled appt to follow up but needs a referral since now consider new again   She reports recently that she is having increased headaches about once weekly lately  ROS: Review of Systems   Constitutional: Negative for appetite change, chills, diaphoresis and fever  Respiratory: Negative  Cardiovascular: Negative  Gastrointestinal: Negative  Genitourinary: Negative  Skin: Negative  Neurological: Positive for headaches  Negative for dizziness, seizures, syncope and weakness  Hematological: Negative          Past Medical History:   Diagnosis Date    Asthma     Cancer ([facility] )     Chicken pox     Depression     Headache     Migraines     Vaginal cancer ([place] [address] )     s/p radiation, treated 2014, [facility]     Patient Active Problem List   Diagnosis    Bipolar 2 disorder, major depressive episode ([place] [id number] )    Chronic migraine without aura    Essential hypertension    Generalized anxiety disorder    Lichen    Tobacco use disorder    Acute systolic CHF (congestive heart failure) (HCC)    Calculus of gallbladder without cholecystitis    Elevated LFTs    Serum total bilirubin elevated       Past Surgical History:   Procedure Laterality Date    CARPAL TUNNEL RELEASE Bilateral     HYSTERECTOMY         Social History     Social History    Marital status: Legally      Spouse name: N/A    Number of children: N/A    Years of education: N/A     Social History Main Topics    Smoking status: Current Every Day Smoker     Packs/day: 1 00     Years: 30 00    Smokeless tobacco: Never Used    Alcohol use Yes      Comment: rarely    Drug use: No    Sexual activity: Not on file     Other Topics Concern    Not on file     Social History Narrative    Lives with sister     Unemployed        Family History   Problem Relation Age of Onset    Hypertension Mother     Heart disease Father     Diabetes Father     Hyperlipidemia Father     Heart attack Father     Stroke Maternal Grandfather     Cancer Maternal Uncle     Brain cancer Maternal Uncle     Stroke Paternal Grandmother        Allergies   Allergen Reactions    Norvasc [Amlodipine]      edema         Current Outpatient Prescriptions:     albuterol (2 5 mg/3 mL) 0 083 % nebulizer solution, Take 1 vial (2 5 mg total) by nebulization every 6 (six) hours as needed for wheezing or shortness of breath, Disp: 60 mL, Rfl: 0    albuterol (PROVENTIL HFA,VENTOLIN HFA) 90 mcg/act inhaler, Inhale 2 puffs every 6 (six) hours as needed for wheezing, Disp: 18 g, Rfl: 1    aspirin 81 MG tablet, Take 81 mg by mouth, Disp: , Rfl:     atorvastatin (LIPITOR) 40 mg tablet, Take 40 mg by mouth, Disp: , Rfl:     DULoxetine (CYMBALTA) 60 mg delayed release capsule, 1 tab po bid, Disp: , Rfl:     ipratropium (ATROVENT) 0 02 % nebulizer solution, Take 1 vial (0 5 mg total) by nebulization every 8 (eight) hours as needed for wheezing or shortness of breath for up to 90 days, Disp: 75 mL, Rfl: 0    lamoTRIgine (LaMICtal) 100 mg tablet, Take 100 mg by mouth, Disp: , Rfl:     levothyroxine 125 mcg tablet, Take 1 tablet (125 mcg total) by mouth daily, Disp: 90 tablet, Rfl: 0    losartan (COZAAR) 100 MG tablet, Take 1 tablet (100 mg total) by mouth daily, Disp: 90 tablet, Rfl: 1    metoprolol succinate (TOPROL-XL) 100 mg 24 hr tablet, Take 100 mg by mouth every evening, Disp: , Rfl: 0    rizatriptan (MAXALT-MLT) 10 MG disintegrating tablet, Take 1 tablet (10 mg total) by mouth once as needed for migraine for up to 1 dose, Disp: 10 tablet, Rfl: 0    nicotine (NICODERM CQ) 21 mg/24 hr TD 24 hr patch, Place 1 patch on the skin every 24 hours, Disp: 28 patch, Rfl: 0      Physical Exam:  /84   Pulse 67   Temp (!) 97 4 °F (36 3 °C)   Resp 16   Ht 5' 5" (1 651 m)   Wt 76 7 kg (169 lb)   SpO2 96%   BMI 28 12 kg/m²     Physical Exam   Constitutional: She is oriented to person, place, and time  She appears well-developed and well-nourished  No distress  HENT:   Head: Normocephalic and atraumatic     Right Ear: External ear normal    Left Ear: External ear normal    Nose: Nose normal  Mouth/Throat: Oropharynx is clear and moist  No oropharyngeal exudate  Eyes: Pupils are equal, round, and reactive to light  Conjunctivae are normal    Neck: Normal range of motion  Neck supple  No thyromegaly present  Cardiovascular: Normal rate, regular rhythm and intact distal pulses  Murmur (1/6 systolic murmur) heard  Pulmonary/Chest: Effort normal  No respiratory distress  She has no wheezes  She has no rales  Musculoskeletal: Normal range of motion  She exhibits no edema  Lymphadenopathy:     She has no cervical adenopathy  Neurological: She is alert and oriented to person, place, and time  No cranial nerve deficit  Skin: Skin is warm and dry  No rash noted  Psychiatric: She has a normal mood and affect   Her behavior is normal          Labs:  Lab Results   Component Value Date    WBC 11 12 (H) 08/09/2018    HGB 14 8 08/09/2018    HCT 45 1 08/09/2018    MCV 90 08/09/2018     08/09/2018     Lab Results   Component Value Date    K 4 4 08/09/2018     08/09/2018    CO2 28 08/09/2018    BUN 19 08/09/2018    CREATININE 1 32 (H) 08/09/2018    GLUF 99 08/09/2018    CALCIUM 9 6 08/09/2018    AST 66 (H) 08/09/2018     (H) 08/09/2018    ALKPHOS 82 08/09/2018    EGFR 48 08/09/2018

## 2019-01-09 DIAGNOSIS — E03.9 HYPOTHYROIDISM, UNSPECIFIED TYPE: ICD-10-CM

## 2019-01-09 RX ORDER — LEVOTHYROXINE SODIUM 0.12 MG/1
TABLET ORAL
Qty: 90 TABLET | Refills: 0 | Status: SHIPPED | OUTPATIENT
Start: 2019-01-09 | End: 2019-11-21

## 2019-02-20 ENCOUNTER — TELEPHONE (OUTPATIENT)
Dept: FAMILY MEDICINE CLINIC | Facility: CLINIC | Age: 49
End: 2019-02-20

## 2019-02-20 NOTE — TELEPHONE ENCOUNTER
LMOM for patient as to no call no show appointment for this date  I did state that this is her third no call no show and it could warrant dismissal from the practice  Thank you

## 2019-04-24 DIAGNOSIS — E03.9 HYPOTHYROIDISM, UNSPECIFIED TYPE: ICD-10-CM

## 2019-04-24 RX ORDER — LEVOTHYROXINE SODIUM 0.12 MG/1
125 TABLET ORAL DAILY
Qty: 90 TABLET | Refills: 0 | OUTPATIENT
Start: 2019-04-24

## 2019-09-27 ENCOUNTER — OFFICE VISIT (OUTPATIENT)
Dept: FAMILY MEDICINE CLINIC | Facility: CLINIC | Age: 49
End: 2019-09-27
Payer: COMMERCIAL

## 2019-09-27 VITALS
OXYGEN SATURATION: 97 % | WEIGHT: 187 LBS | SYSTOLIC BLOOD PRESSURE: 122 MMHG | DIASTOLIC BLOOD PRESSURE: 70 MMHG | TEMPERATURE: 98.1 F | HEIGHT: 65 IN | BODY MASS INDEX: 31.16 KG/M2 | HEART RATE: 69 BPM | RESPIRATION RATE: 18 BRPM

## 2019-09-27 DIAGNOSIS — R05.9 COUGH: ICD-10-CM

## 2019-09-27 DIAGNOSIS — R63.5 WEIGHT GAIN: ICD-10-CM

## 2019-09-27 DIAGNOSIS — Z91.19 NONCOMPLIANCE: ICD-10-CM

## 2019-09-27 DIAGNOSIS — E03.9 HYPOTHYROIDISM, UNSPECIFIED TYPE: ICD-10-CM

## 2019-09-27 DIAGNOSIS — F17.200 TOBACCO USE DISORDER: ICD-10-CM

## 2019-09-27 DIAGNOSIS — R07.81 PLEURITIC PAIN: Primary | ICD-10-CM

## 2019-09-27 DIAGNOSIS — Z86.79 HISTORY OF CHF (CONGESTIVE HEART FAILURE): ICD-10-CM

## 2019-09-27 PROBLEM — Z91.199 NONCOMPLIANCE: Status: ACTIVE | Noted: 2019-09-27

## 2019-09-27 PROCEDURE — 87631 RESP VIRUS 3-5 TARGETS: CPT | Performed by: FAMILY MEDICINE

## 2019-09-27 PROCEDURE — 99214 OFFICE O/P EST MOD 30 MIN: CPT | Performed by: FAMILY MEDICINE

## 2019-09-27 PROCEDURE — 3008F BODY MASS INDEX DOCD: CPT | Performed by: FAMILY MEDICINE

## 2019-09-27 NOTE — PROGRESS NOTES
Assessment/Plan:       Diagnoses and all orders for this visit:    Pleuritic pain  -     XR chest pa & lateral; Future  -     Comprehensive metabolic panel; Future  -     CBC and differential; Future  -     INFLUENZA A/B AND RSV, PCR; Future  -     NT-BNP PRO; Future  -     INFLUENZA A/B AND RSV, PCR    Cough  -     XR chest pa & lateral; Future  -     Comprehensive metabolic panel; Future  -     CBC and differential; Future  -     INFLUENZA A/B AND RSV, PCR; Future  -     NT-BNP PRO; Future  -     INFLUENZA A/B AND RSV, PCR    Weight gain  -     XR chest pa & lateral; Future  -     Comprehensive metabolic panel; Future  -     CBC and differential; Future  -     T4, free; Future  -     T3; Future  -     TSH, 3rd generation; Future  -     NT-BNP PRO; Future    Noncompliance  Comments:  advised that she is risking her health, including risk of death, being noncompliant  Orders:  -     Comprehensive metabolic panel; Future  -     CBC and differential; Future  -     T4, free; Future  -     T3; Future  -     TSH, 3rd generation; Future    Hypothyroidism, unspecified type  Comments:  per hpi, has been noncompliant with f/u and not taking her synthroid for many months; this is same day sick visit, but overdue testing ordered today  Orders:  -     Comprehensive metabolic panel; Future  -     CBC and differential; Future  -     T4, free; Future  -     T3; Future  -     TSH, 3rd generation; Future    Tobacco use disorder  -     Comprehensive metabolic panel; Future  -     CBC and differential; Future    History of CHF (congestive heart failure)  Comments:  as above, pt is noncompliant and is overdue for f/u and testing, necessary testing ordered today  Orders:  -     XR chest pa & lateral; Future  -     Comprehensive metabolic panel; Future  -     CBC and differential; Future  -     NT-BNP PRO;  Future          Subjective:   Chief Complaint   Patient presents with    Cold Like Symptoms        Patient ID: Viki Sanders is a 51 y o  female  Same day sick appt   c/o 3 weeks cough, runny/stuffy nose, but past week cough "really got bad and last 3 days, my side is hurting" and pleuritic pain with cough "so bad want to cry"  No SOB or STEWART  No ill contacts, no recent travel  Last 2 days a little sputum expectorated, not much per pt  Using cough medicine and ibuprofen  Denies chills, sweats, fever  No prior similar problem   also on chart review, see that pt has been noncompliant for quite some time, she was put on synthroid after routine testing showed TSH >85, but she did not follow up as directed and has at least 3 no-shows on record, meds were not refilled after her last no-show, so she has not had any synthroid since about April this year  Has not had any labs since over a year ago   has hx CHF but has not f/u with cardiol  Admits weight gain, denies any throat sx          10/8/2018  [facility]  Assessment/Plan:   Essential hypertension  Mildly elevated today but ran out of hydralazine on Friday  Refills sent in  Acute systolic CHF (congestive heart failure) (HCC)  Euvolemic  NYHA class I-II  Increase metoprolol 100 mg daily  No lasix for 3 days - will stop  Aware of s/s to call with for lasix  Discussed heart failure, signs/symptoms of fluid overload, adhering to a 2 Gm sodium diet and importance of daily weights  If NYHA class II symptoms persist, will consider entresto  Repeat echo early December    Medical therapy for HFrEF:  Beta-blocker - metoprolol 100 mg daily  ACEI/ARB - losartan 100 mg daily  Aldosterone antagonist - no - hyperkalemic on aldactone   Electronically signed by Jose Daniel Che at 10/08/2018 10:55 AM EDT          The following portions of the patient's history were reviewed and updated as appropriate: allergies, current medications, past family history, past medical history, past social history, past surgical history and problem list     Review of Systems   Constitutional: Negative for chills, diaphoresis and fever  HENT: Negative for mouth sores, nosebleeds, sinus pressure, sore throat, trouble swallowing and voice change  Respiratory: Negative for apnea, choking, chest tightness, shortness of breath, wheezing and stridor  Cardiovascular: Negative for chest pain (no other cp besides pleuritic), palpitations and leg swelling  Gastrointestinal: Negative  Endocrine:        Per hpi   Skin: Negative  Hematological: Negative  Objective:      /70   Pulse 69   Temp 98 1 °F (36 7 °C)   Resp 18   Ht 5' 5" (1 651 m)   Wt 84 8 kg (187 lb)   SpO2 97%   BMI 31 12 kg/m²          Physical Exam   Constitutional: She is oriented to person, place, and time  She appears well-developed  She is cooperative  Non-toxic appearance  She does not have a sickly appearance  No distress  Pt is visibly in pain with deep breathing and with the few times she coughs during visit   HENT:   Right Ear: Tympanic membrane and ear canal normal    Left Ear: Tympanic membrane and ear canal normal    Nose: Nose normal    Mouth/Throat: Uvula is midline and oropharynx is clear and moist    Eyes: Conjunctivae and lids are normal    No exopthalmos   Neck: Trachea normal and phonation normal  No JVD present  No thyroid mass and no thyromegaly present  Cardiovascular: Normal rate, regular rhythm and normal heart sounds  No edema   Pulmonary/Chest: Effort normal  No stridor  She has decreased breath sounds (decreased resp excursion due to pleuritic pain) in the right lower field and the left lower field  She has no wheezes  She has no rhonchi  She has no rales  Lymphadenopathy:     She has no cervical adenopathy  Neurological: She is alert and oriented to person, place, and time  Gait normal    Skin: Skin is warm and dry  Capillary refill takes less than 2 seconds  She is not diaphoretic  No cyanosis  No pallor  Psychiatric: She has a normal mood and affect     In pain    Nursing note and vitals reviewed

## 2019-09-28 LAB
FLUAV AG SPEC QL: NOT DETECTED
FLUBV AG SPEC QL: NOT DETECTED
RSV B RNA SPEC QL NAA+PROBE: NOT DETECTED

## 2019-11-21 ENCOUNTER — TELEPHONE (OUTPATIENT)
Dept: FAMILY MEDICINE CLINIC | Facility: CLINIC | Age: 49
End: 2019-11-21

## 2019-11-21 ENCOUNTER — OFFICE VISIT (OUTPATIENT)
Dept: FAMILY MEDICINE CLINIC | Facility: CLINIC | Age: 49
End: 2019-11-21
Payer: COMMERCIAL

## 2019-11-21 VITALS
HEART RATE: 71 BPM | RESPIRATION RATE: 17 BRPM | DIASTOLIC BLOOD PRESSURE: 84 MMHG | OXYGEN SATURATION: 100 % | BODY MASS INDEX: 31.49 KG/M2 | HEIGHT: 65 IN | TEMPERATURE: 97.4 F | SYSTOLIC BLOOD PRESSURE: 126 MMHG | WEIGHT: 189 LBS

## 2019-11-21 DIAGNOSIS — Z13.1 DIABETES MELLITUS SCREENING: ICD-10-CM

## 2019-11-21 DIAGNOSIS — L30.9 DERMATITIS: ICD-10-CM

## 2019-11-21 DIAGNOSIS — R21 LOCALIZED PAPULAR RASH: Primary | ICD-10-CM

## 2019-11-21 DIAGNOSIS — I10 ESSENTIAL HYPERTENSION: ICD-10-CM

## 2019-11-21 DIAGNOSIS — E03.9 HYPOTHYROIDISM, UNSPECIFIED TYPE: ICD-10-CM

## 2019-11-21 DIAGNOSIS — Z12.39 BREAST CANCER SCREENING: ICD-10-CM

## 2019-11-21 DIAGNOSIS — Z11.4 ENCOUNTER FOR SCREENING FOR HIV: ICD-10-CM

## 2019-11-21 DIAGNOSIS — Z91.19 NONCOMPLIANCE: ICD-10-CM

## 2019-11-21 DIAGNOSIS — Z86.79 HISTORY OF CHF (CONGESTIVE HEART FAILURE): ICD-10-CM

## 2019-11-21 PROCEDURE — 4004F PT TOBACCO SCREEN RCVD TLK: CPT | Performed by: PHYSICIAN ASSISTANT

## 2019-11-21 PROCEDURE — 99214 OFFICE O/P EST MOD 30 MIN: CPT | Performed by: PHYSICIAN ASSISTANT

## 2019-11-21 RX ORDER — TRIAMCINOLONE ACETONIDE 5 MG/G
CREAM TOPICAL 2 TIMES DAILY
Qty: 30 G | Refills: 0 | Status: SHIPPED | OUTPATIENT
Start: 2019-11-21

## 2019-11-21 NOTE — PROGRESS NOTES
Lilia Nehal 51 y o  female   Date:  11/21/2019      Assessment and Plan:    Lasandra Shone was seen today for rash  Diagnoses and all orders for this visit:    Localized papular rash  -     mupirocin (BACTROBAN) 2 % ointment; Apply topically 3 (three) times a day  -     triamcinolone (KENALOG) 0 5 % cream; Apply topically 2 (two) times a day  - apply x 2 weeks and follow up     Dermatitis  -     mupirocin (BACTROBAN) 2 % ointment; Apply topically 3 (three) times a day  -     triamcinolone (KENALOG) 0 5 % cream; Apply topically 2 (two) times a day    Noncompliance  - patient overdue for follow up, encourage compliance     Hypothyroidism, unspecified type  -     TSH, 3rd generation; Future  -     T4, free; Future  - likely cause of generalized dry skin     History of CHF (congestive heart failure)  -     CBC and differential; Future  -     Comprehensive metabolic panel; Future  -     Lipid panel; Future  - overdue for cardiology follow up     Essential hypertension  -     Comprehensive metabolic panel; Future  - stable    Encounter for screening for HIV  -     HIV 1/2 AG-AB combo; Future    Diabetes mellitus screening  -     Hemoglobin A1C; Future    Breast cancer screening  -     Mammo screening bilateral w 3d & cad; Future         HPI:  Chief Complaint   Patient presents with    Rash     Reg pimply rash in between breast  Rash itches and hurts  First noticed about two weeks ago  Rash is starting to spread  HPI  Patient is a 50 yo female who presents with rash in between breast that has erupted over the past 2 weeks  The rash is red, itchy and area or open skin, oozing  She cannot appreciate any new skin contacts - soaps, lotions, creams, detergents  She was putting peroxide and neosporin on it  No fevers, chills  Of note, patient has not been compliant with with follow up  She was last seen for routine follow up in Nov of last year  She did not go for labs and is not taking thyroid medications   She is compliant with medications from cardiology - BB, losartan and statin  She sees Dr Vy Byrd in [facility] for psych  She is on cymbalta for major depression  She is looking to go back on Lamictal  She sees [facility] and is overdue for pap  She is scheduled to have mammogram done in Jan      ROS: Review of Systems   Constitutional: Negative for chills, diaphoresis and fever  Respiratory: Negative  Cardiovascular: Negative  Skin: Positive for rash  Negative for color change and pallor  Dry skin    Allergic/Immunologic: Negative for environmental allergies  Neurological: Negative  Hematological: Negative          Past Medical History:   Diagnosis Date    Asthma     Cancer ([facility] )     Chicken pox     Depression     Headache     Migraines     Vaginal cancer ([facility] )     s/p radiation, treated 2014, [facility]     Patient Active Problem List   Diagnosis    Bipolar 2 disorder, major depressive episode ([facility] )    Chronic migraine without aura    Essential hypertension    Generalized anxiety disorder    Lichen    Tobacco use disorder    Acute systolic CHF (congestive heart failure) (HCC)    Calculus of gallbladder without cholecystitis    Elevated LFTs    Serum total bilirubin elevated    Noncompliance    Cough    Weight gain    Hypothyroidism    History of CHF (congestive heart failure)       Past Surgical History:   Procedure Laterality Date    CARPAL TUNNEL RELEASE Bilateral     HYSTERECTOMY         Social History     Socioeconomic History    Marital status: Legally      Spouse name: None    Number of children: None    Years of education: None    Highest education level: None   Occupational History    None   Social Needs    Financial resource strain: None    Food insecurity:     Worry: None     Inability: None    Transportation needs:     Medical: None     Non-medical: None   Tobacco Use    Smoking status: Current Every Day Smoker     Packs/day: 1 00     Years: 30 00 Pack years: 30 00    Smokeless tobacco: Never Used   Substance and Sexual Activity    Alcohol use: Yes     Frequency: Monthly or less     Drinks per session: 1 or 2     Binge frequency: Never     Comment: rarely    Drug use: No    Sexual activity: Not Currently   Lifestyle    Physical activity:     Days per week: None     Minutes per session: None    Stress: None   Relationships    Social connections:     Talks on phone: None     Gets together: None     Attends [employer] service: None     Active member of club or organization: None     Attends meetings of clubs or organizations: None     Relationship status: None    Intimate partner violence:     Fear of current or ex partner: None     Emotionally abused: None     Physically abused: None     Forced sexual activity: None   Other Topics Concern    None   Social History Narrative    Lives with sister     Unemployed        Family History   Problem Relation Age of Onset    Hypertension Mother     Heart disease Father     Diabetes Father     Hyperlipidemia Father     Heart attack Father     Stroke Maternal Grandfather     Cancer Maternal Uncle     Brain cancer Maternal Uncle     Stroke Paternal Grandmother        Allergies   Allergen Reactions    Norvasc [Amlodipine]      edema         Current Outpatient Medications:     albuterol (2 5 mg/3 mL) 0 083 % nebulizer solution, Take 1 vial (2 5 mg total) by nebulization every 6 (six) hours as needed for wheezing or shortness of breath, Disp: 60 mL, Rfl: 0    albuterol (PROVENTIL HFA,VENTOLIN HFA) 90 mcg/act inhaler, Inhale 2 puffs every 6 (six) hours as needed for wheezing, Disp: 18 g, Rfl: 1    aspirin 81 MG tablet, Take 81 mg by mouth, Disp: , Rfl:     atorvastatin (LIPITOR) 40 mg tablet, Take 40 mg by mouth, Disp: , Rfl:     DULoxetine (CYMBALTA) 60 mg delayed release capsule, 60 mg 2 (two) times a day , Disp: , Rfl:     losartan (COZAAR) 100 MG tablet, Take 1 tablet (100 mg total) by mouth daily, Disp: 90 tablet, Rfl: 1    metoprolol succinate (TOPROL-XL) 100 mg 24 hr tablet, Take 100 mg by mouth every evening, Disp: , Rfl: 0    rizatriptan (MAXALT-MLT) 10 MG disintegrating tablet, Take 1 tablet (10 mg total) by mouth once as needed for migraine for up to 1 dose, Disp: 10 tablet, Rfl: 0    lamoTRIgine (LaMICtal) 100 mg tablet, Take 100 mg by mouth, Disp: , Rfl:     mupirocin (BACTROBAN) 2 % ointment, Apply topically 3 (three) times a day, Disp: 22 g, Rfl: 0    triamcinolone (KENALOG) 0 5 % cream, Apply topically 2 (two) times a day, Disp: 30 g, Rfl: 0      Physical Exam:  /84 (BP Location: Left arm, Patient Position: Sitting)   Pulse 71   Temp (!) 97 4 °F (36 3 °C) (Tympanic)   Resp 17   Ht 5' 5" (1 651 m)   Wt 85 7 kg (189 lb)   SpO2 100%   BMI 31 45 kg/m²     Physical Exam   Constitutional: She is oriented to person, place, and time  She appears well-developed and well-nourished  No distress  obese   HENT:   Head: Normocephalic and atraumatic  Eyes: Pupils are equal, round, and reactive to light  Conjunctivae are normal    Musculoskeletal: She exhibits no edema or deformity  Neurological: She is alert and oriented to person, place, and time  No cranial nerve deficit  Skin: Skin is warm and dry  Rash (erythematous papular rash between breasts, some pustular lesions and one area of excoration with oozing open skin ) noted  Psychiatric: She has a normal mood and affect   Her behavior is normal          Labs:  Lab Results   Component Value Date    WBC 11 12 (H) 08/09/2018    HGB 14 8 08/09/2018    HCT 45 1 08/09/2018    MCV 90 08/09/2018     08/09/2018     Lab Results   Component Value Date    K 4 4 08/09/2018     08/09/2018    CO2 28 08/09/2018    BUN 19 08/09/2018    CREATININE 1 32 (H) 08/09/2018    GLUF 99 08/09/2018    CALCIUM 9 6 08/09/2018    AST 66 (H) 08/09/2018     (H) 08/09/2018    ALKPHOS 82 08/09/2018    EGFR 48 08/09/2018

## 2019-12-10 ENCOUNTER — TELEPHONE (OUTPATIENT)
Dept: FAMILY MEDICINE CLINIC | Facility: CLINIC | Age: 49
End: 2019-12-10

## 2020-01-28 ENCOUNTER — TELEPHONE (OUTPATIENT)
Dept: FAMILY MEDICINE CLINIC | Facility: CLINIC | Age: 50
End: 2020-01-28

## 2020-01-28 NOTE — TELEPHONE ENCOUNTER
Patient was discharged on 12/10/19 due to frequent no call no shows  Patient was sent a letter on 12/10/19 along with a medical information release to sign and return to the office  We did not receive the medical release back as of yet

## 2020-02-04 NOTE — TELEPHONE ENCOUNTER
02/04/20 10:28 AM     Thank you for your request  Your request has been received, reviewed, and the patient chart updated  The PCP has successfully been removed with a patient attribution note  This message will now be completed      Thank you  Rasta Suresh

## 2021-04-19 ENCOUNTER — OFFICE VISIT (OUTPATIENT)
Dept: URGENT CARE | Age: 51
End: 2021-04-19
Payer: COMMERCIAL

## 2021-04-19 VITALS
RESPIRATION RATE: 18 BRPM | HEART RATE: 84 BPM | BODY MASS INDEX: 30.53 KG/M2 | OXYGEN SATURATION: 99 % | HEIGHT: 66 IN | WEIGHT: 190 LBS | TEMPERATURE: 97.1 F

## 2021-04-19 DIAGNOSIS — B30.9 ACUTE VIRAL CONJUNCTIVITIS OF BOTH EYES: Primary | ICD-10-CM

## 2021-04-19 DIAGNOSIS — Z11.59 SPECIAL SCREENING EXAMINATION FOR UNSPECIFIED VIRAL DISEASE: ICD-10-CM

## 2021-04-19 DIAGNOSIS — M62.838 NECK MUSCLE SPASM: ICD-10-CM

## 2021-04-19 DIAGNOSIS — Z72.0 TOBACCO USE: ICD-10-CM

## 2021-04-19 DIAGNOSIS — J30.2 SEASONAL ALLERGIES: ICD-10-CM

## 2021-04-19 PROCEDURE — 99283 EMERGENCY DEPT VISIT LOW MDM: CPT | Performed by: NURSE PRACTITIONER

## 2021-04-19 PROCEDURE — G0382 LEV 3 HOSP TYPE B ED VISIT: HCPCS | Performed by: NURSE PRACTITIONER

## 2021-04-19 PROCEDURE — 99203 OFFICE O/P NEW LOW 30 MIN: CPT | Performed by: NURSE PRACTITIONER

## 2021-04-19 RX ORDER — METHOCARBAMOL 500 MG/1
500 TABLET, FILM COATED ORAL 3 TIMES DAILY PRN
Qty: 30 TABLET | Refills: 0 | Status: SHIPPED | OUTPATIENT
Start: 2021-04-19

## 2021-04-19 RX ORDER — LIDOCAINE 50 MG/G
1 PATCH TOPICAL DAILY
Qty: 15 PATCH | Refills: 0 | Status: SHIPPED | OUTPATIENT
Start: 2021-04-19

## 2021-04-19 RX ORDER — POLYMYXIN B SULFATE AND TRIMETHOPRIM 1; 10000 MG/ML; [USP'U]/ML
1 SOLUTION OPHTHALMIC EVERY 4 HOURS
Qty: 10 ML | Refills: 0 | Status: SHIPPED | OUTPATIENT
Start: 2021-04-19 | End: 2021-04-26

## 2021-04-19 NOTE — PROGRESS NOTES
[facility]        NAME: Mendy Velazquez is a 53 y o  female  : 1970    MRN: [id number]  DATE: 2021  TIME: 2:13 PM    Assessment and Plan   Acute viral conjunctivitis of both eyes [B30 9]  1  Acute viral conjunctivitis of both eyes  polymyxin b-trimethoprim (POLYTRIM) ophthalmic solution   2  Special screening examination for unspecified viral disease  Novel Coronavirus (Covid-19),PCR [facility] - Office Collection   3  Neck muscle spasm  lidocaine (LIDODERM) 5 %    methocarbamol (ROBAXIN) 500 mg tablet   4  Tobacco use     5  Seasonal allergies           Patient Instructions       Follow up with PCP in 3-5 days  Proceed to  ER if symptoms worsen  You have been prescribed polytrim eye drops - use as directed  Apply warm or cool compresses  Wash hands frequently  Follow up with your eye doctor in 72 hours if symptoms are not better or worse  You have been prescribed robaxin for muscle spasms and lidoderm patches for pain  You are to apply warm and cold compresses to the neck  You are to take tylenol for pain  Follow up with your PCP   Stop smoking  Go to the ED if symptoms worsen      Chief Complaint     Chief Complaint   Patient presents with    Eye Problem     pt reports pinkness, discharge and crusting in both eyes, worse in left , that started Saturday   Neck Pain     pt reports "popping" sound in neck that started one week ago  No injury reported    Cough     pt noted by registration staff to have congestion and cough but pt states "I have allergies, I don't have Covid"          History of Present Illness       This is a 53year old female who reports pinkness, discharge and crusting in both eyes, worse in left , that started Saturday  Denies contact use  She also states that she has  "popping" sound in neck that started one week ago  No injury reported  She states pain is left lateral neck    She was heard coughing in the [facility] and states that she does smoke, has allergies and has CHF  Denies weight gain, SOB, fevers, or covid exposure  Eye Problem   Associated symptoms include an eye discharge, eye redness and itching  Neck Pain     Cough  Associated symptoms include eye redness  Review of Systems   Review of Systems   Constitutional: Negative  HENT: Negative  Eyes: Positive for discharge, redness and itching  Respiratory: Positive for cough  Cardiovascular: Negative  Gastrointestinal: Negative  Endocrine: Negative  Genitourinary: Negative  Musculoskeletal: Positive for neck pain  Skin: Negative  Allergic/Immunologic: Negative  Neurological: Negative  Hematological: Negative  Psychiatric/Behavioral: Negative            Current Medications       Current Outpatient Medications:     albuterol (2 5 mg/3 mL) 0 083 % nebulizer solution, Take 1 vial (2 5 mg total) by nebulization every 6 (six) hours as needed for wheezing or shortness of breath, Disp: 60 mL, Rfl: 0    albuterol (PROVENTIL HFA,VENTOLIN HFA) 90 mcg/act inhaler, Inhale 2 puffs every 6 (six) hours as needed for wheezing, Disp: 18 g, Rfl: 1    aspirin 81 MG tablet, Take 81 mg by mouth, Disp: , Rfl:     atorvastatin (LIPITOR) 40 mg tablet, Take 40 mg by mouth, Disp: , Rfl:     DULoxetine (CYMBALTA) 60 mg delayed release capsule, 60 mg 2 (two) times a day , Disp: , Rfl:     lamoTRIgine (LaMICtal) 100 mg tablet, Take 100 mg by mouth, Disp: , Rfl:     losartan (COZAAR) 100 MG tablet, Take 1 tablet (100 mg total) by mouth daily, Disp: 90 tablet, Rfl: 1    metoprolol succinate (TOPROL-XL) 100 mg 24 hr tablet, Take 100 mg by mouth every evening, Disp: , Rfl: 0    rizatriptan (MAXALT-MLT) 10 MG disintegrating tablet, Take 1 tablet (10 mg total) by mouth once as needed for migraine for up to 1 dose, Disp: 10 tablet, Rfl: 0    lidocaine (LIDODERM) 5 %, Apply 1 patch topically daily Remove & Discard patch within 12 hours or as directed by MD, Disp: 15 patch, Rfl: 0    methocarbamol (ROBAXIN) 500 mg tablet, Take 1 tablet (500 mg total) by mouth 3 (three) times a day as needed for muscle spasms, Disp: 30 tablet, Rfl: 0    mupirocin (BACTROBAN) 2 % ointment, Apply topically 3 (three) times a day (Patient not taking: Reported on 4/19/2021), Disp: 22 g, Rfl: 0    polymyxin b-trimethoprim (POLYTRIM) ophthalmic solution, Administer 1 drop to both eyes every 4 (four) hours for 7 days, Disp: 10 mL, Rfl: 0    triamcinolone (KENALOG) 0 5 % cream, Apply topically 2 (two) times a day (Patient not taking: Reported on 4/19/2021), Disp: 30 g, Rfl: 0    Current Allergies     Allergies as of 04/19/2021 - Reviewed 04/19/2021   Allergen Reaction Noted    Norvasc [amlodipine]  07/25/2018            The following portions of the patient's history were reviewed and updated as appropriate: allergies, current medications, past family history, past medical history, past social history, past surgical history and problem list      Past Medical History:   Diagnosis Date    Asthma     Cancer ([facility] [address] [facility] )     Chicken pox     Depression     Headache     Migraines     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, treated 2014, [facility]       Past Surgical History:   Procedure Laterality Date    CARPAL TUNNEL RELEASE Bilateral     HYSTERECTOMY         Family History   Problem Relation Age of Onset    Hypertension Mother     Heart disease Father     Diabetes Father     Hyperlipidemia Father     Heart attack Father     Stroke Maternal Grandfather     Cancer Maternal Uncle     Brain cancer Maternal Uncle     Stroke Paternal Grandmother          Medications have been verified  Objective   Pulse 84   Temp (!) 97 1 °F (36 2 °C)   Resp 18   Ht 5' 6" (1 676 m)   Wt 86 2 kg (190 lb)   SpO2 99%   BMI 30 67 kg/m²   No LMP recorded  Patient has had a hysterectomy  Physical Exam     Physical Exam  Vitals signs and nursing note reviewed  Constitutional:       General: She is not in acute distress  Appearance: Normal appearance  She is obese  She is not ill-appearing, toxic-appearing or diaphoretic  HENT:      Head: Normocephalic and atraumatic  Right Ear: Tympanic membrane and ear canal normal       Left Ear: Tympanic membrane and ear canal normal       Nose: Nose normal       Mouth/Throat:      Mouth: Mucous membranes are moist    Eyes:      Extraocular Movements: Extraocular movements intact  Pupils: Pupils are equal, round, and reactive to light  Comments: Crusting noted to bilateral eye lashes  Left sclera red and b/l conjunctiva is injected    Neck:      Musculoskeletal: Normal range of motion  Muscular tenderness present  Cardiovascular:      Rate and Rhythm: Normal rate and regular rhythm  Heart sounds: Normal heart sounds  Pulmonary:      Effort: Pulmonary effort is normal       Breath sounds: Normal breath sounds  Abdominal:      General: There is no distension  Tenderness: There is no abdominal tenderness  Musculoskeletal: Normal range of motion  Skin:     General: Skin is warm and dry  Capillary Refill: Capillary refill takes less than 2 seconds  Neurological:      General: No focal deficit present  Mental Status: She is alert and oriented to person, place, and time  Psychiatric:         Mood and Affect: Mood normal          Behavior: Behavior normal          Thought Content:  Thought content normal          Judgment: Judgment normal

## 2021-04-19 NOTE — PATIENT INSTRUCTIONS
You have been prescribed polytrim eye drops - use as directed  Apply warm or cool compresses  Wash hands frequently  Follow up with your eye doctor in 72 hours if symptoms are not better or worse  You have been prescribed robaxin for muscle spasms and lidoderm patches for pain  You are to apply warm and cold compresses to the neck  You are to take tylenol for pain  Follow up with your PCP   Stop smoking  Go to the ED if symptoms worsen    Conjunctivitis   WHAT YOU SHOULD KNOW:   Conjunctivitis, or pink eye, is inflammation of your conjunctiva  The conjunctiva is a thin tissue that covers the front of your eye and the back of your eyelids  The conjunctiva helps protect your eye and keep it moist         INSTRUCTIONS:   Medicines:   · Allergy medicine: This medicine helps decrease itchy, red, swollen eyes caused by allergies  It may be given as a pill, eye drops, or nasal spray  · Antibiotics:  You will need antibiotics if your conjunctivitis is caused by bacteria  This medicine may be given as eye drops or eye ointment  · Steroid medicine: This medicine helps decrease inflammation  It may be given as a pill, eye drops, or nasal spray  · Take your medicine as directed  Call your healthcare provider if you think your medicine is not helping or if you have side effects  Tell him if you are allergic to any medicine  Keep a list of the medicines, vitamins, and herbs you take  Include the amounts, and when and why you take them  Bring the list or the pill bottles to follow-up visits  Carry your medicine list with you in case of an emergency  Follow up with your primary healthcare provider as directed: You may need to return for more tests on your eyes  These will help your primary healthcare provider check for eye damage  Write down your questions so you remember to ask them during your visits  Avoid the spread of conjunctivitis:   · Wash your hands often:  Wash your hands before you touch your eyes  Also wash your hands before you prepare or eat food and after you use the bathroom or change a diaper  · Avoid allergens:  Try to avoid the things that cause your allergies, such as pets, dust, or grass  · Avoid contact:  Do not share towels or washcloths  Try to stay away from others as much as possible  Ask when you can return to work or school  · Throw away eye makeup:  Throw away mascara and other eye makeup  Manage your symptoms:  · Apply a cool compress:  Wet a washcloth with cold water and place it on your eye  This will help decrease swelling  · Use eye drops:  Eye drops, or artificial tears, can be bought without a doctor's order  They help keep your eye moist     · Do not wear contact lenses: They can irritate your eye  Throw away the pair you are using and ask when you can wear them again  Use a new pair of lenses when your primary healthcare provider says it is okay  · Flush your eye:  You may need to flush your eye with saline to help decrease your symptoms  Ask for more information on how to flush your eye  Contact your primary healthcare provider if:   · Your eyesight becomes blurry  · You have tiny bumps or spots of blood on your eye  · You have questions or concerns about your condition or care  Return to the emergency department if:   · The swelling in your eye gets worse, even after treatment  · Your vision suddenly becomes worse or you cannot see at all  · Your eye begins to bleed  © 2014 [address] is for End User's use only and may not be sold, redistributed or otherwise used for commercial purposes  All illustrations and images included in CareNotes® are the copyrighted property of [employer]  or Venkata Walker  The above information is an  only  It is not intended as medical advice for individual conditions or treatments   Talk to your doctor, nurse or pharmacist before following any medical regimen to see if it is safe and effective for you  Muscle Spasm   WHAT YOU NEED TO KNOW:   A muscle spasm is a sudden contraction of any muscle or group of muscles  A muscle cramp is a painful muscle spasm  Muscle cramps commonly occur after intense exercise or during pregnancy  They may also be caused by certain medications, dehydration, low calcium or magnesium levels, or another medical condition  DISCHARGE INSTRUCTIONS:   Medicines: You may need the following:  · NSAIDs  help decrease swelling and pain or fever  This medicine is available with or without a doctor's order  NSAIDs can cause stomach bleeding or kidney problems in certain people  If you take blood thinner medicine, always ask your healthcare provider if NSAIDs are safe for you  Always read the medicine label and follow directions  · Take your medicine as directed  Contact your healthcare provider if you think your medicine is not helping or if you have side effects  Tell him of her if you are allergic to any medicine  Keep a list of the medicines, vitamins, and herbs you take  Include the amounts, and when and why you take them  Bring the list or the pill bottles to follow-up visits  Carry your medicine list with you in case of an emergency  Follow up with your healthcare provider as directed: You may need other tests or treatment  You may also be referred to a physical therapist or other specialist  Write down your questions so you remember to ask them during your visits  Self-care:   · Stretch  your muscle to help relieve the cramp  It may be helpful to keep your muscle in the stretched position until the cramp is gone  · Apply heat  to help decrease pain and muscle spasms  Apply heat on the area for 20 to 30 minutes every 2 hours for as many days as directed  · Apply ice  to help decrease swelling and pain  Ice may also help prevent tissue damage  Use an ice pack, or put crushed ice in a plastic bag   Cover it with a towel and place it on your muscle for 15 to 20 minutes every hour or as directed  · Drink more liquids  to help prevent muscle cramps caused by dehydration  Sports drinks may help replace electrolytes you lose through sweat during exercise  Ask your healthcare provider how much liquid to drink each day and which liquids are best for you  · Eat healthy foods , such as fruits, vegetables, whole grains, low-fat dairy products, and lean proteins (meat, beans, and fish)  If you are pregnant, ask your healthcare provider about foods that are high in magnesium and sodium  They may help to relieve cramps during pregnancy  · Massage your muscle  to help relieve the cramp  · Take frequent deep breaths  until the cramp feels better  Lie down while you take the deep breaths so you do not get dizzy or lightheaded  Contact your healthcare provider if:   · You have signs of dehydration, such as a headache, dark yellow urine, dry eyes or mouth, or a fast heartbeat  · You have questions or concerns about your condition or care  Return to the emergency department if:   · You have warmth, swelling, or redness in the cramping muscle  · You have frequent or unrelieved muscle cramps in several different muscles  · You have muscle cramps with numbness, tingling, and burning in your hands and feet  © Copyright [address] Information is for End User's use only and may not be sold, redistributed or otherwise used for commercial purposes  All illustrations and images included in CareNotes® are the copyrighted property of [employer]  or [address] [place] [address] Mayra   The above information is an  only  It is not intended as medical advice for individual conditions or treatments  Talk to your doctor, nurse or pharmacist before following any medical regimen to see if it is safe and effective for you

## 2021-04-19 NOTE — LETTER
April 19, 2021     Patient: Chantell Emerson   YOB: 1970   Date of Visit: 4/19/2021       To Whom It May Concern: It is my medical opinion that Reji Marte may return to work on 4/21/2021  If you have any questions or concerns, please don't hesitate to call           Sincerely,        PINA Li    CC: No Recipients

## 2022-05-05 ENCOUNTER — VBI (OUTPATIENT)
Dept: ADMINISTRATIVE | Facility: OTHER | Age: 52
End: 2022-05-05

## 2022-08-09 ENCOUNTER — VBI (OUTPATIENT)
Dept: ADMINISTRATIVE | Facility: OTHER | Age: 52
End: 2022-08-09

## 2023-01-12 ENCOUNTER — VBI (OUTPATIENT)
Dept: ADMINISTRATIVE | Facility: OTHER | Age: 53
End: 2023-01-12

## 2023-01-12 NOTE — TELEPHONE ENCOUNTER
01/12/23 11:02 AM     VB [employer] SmartForm used to document caregap status      [facility]

## 2023-12-01 ENCOUNTER — VBI (OUTPATIENT)
Dept: ADMINISTRATIVE | Facility: OTHER | Age: 53
End: 2023-12-01